# Patient Record
Sex: MALE | Race: WHITE | NOT HISPANIC OR LATINO
[De-identification: names, ages, dates, MRNs, and addresses within clinical notes are randomized per-mention and may not be internally consistent; named-entity substitution may affect disease eponyms.]

---

## 2017-06-19 ENCOUNTER — APPOINTMENT (OUTPATIENT)
Dept: SPINE | Facility: CLINIC | Age: 72
End: 2017-06-19

## 2017-06-19 VITALS
DIASTOLIC BLOOD PRESSURE: 99 MMHG | WEIGHT: 170 LBS | SYSTOLIC BLOOD PRESSURE: 158 MMHG | HEIGHT: 70 IN | HEART RATE: 68 BPM | BODY MASS INDEX: 24.34 KG/M2

## 2017-06-19 RX ORDER — TRAMADOL HYDROCHLORIDE AND ACETAMINOPHEN 37.5; 325 MG/1; MG/1
37.5-325 TABLET, FILM COATED ORAL
Qty: 30 | Refills: 0 | Status: DISCONTINUED | COMMUNITY
Start: 2017-04-17

## 2017-06-19 RX ORDER — METHYLPREDNISOLONE 4 MG/1
4 TABLET ORAL
Qty: 21 | Refills: 0 | Status: DISCONTINUED | COMMUNITY
Start: 2017-04-19

## 2017-06-19 RX ORDER — CIPROFLOXACIN HYDROCHLORIDE 500 MG/1
500 TABLET, FILM COATED ORAL
Qty: 28 | Refills: 0 | Status: DISCONTINUED | COMMUNITY
Start: 2017-03-17

## 2017-06-20 ENCOUNTER — OUTPATIENT (OUTPATIENT)
Dept: OUTPATIENT SERVICES | Facility: HOSPITAL | Age: 72
LOS: 1 days | End: 2017-06-20

## 2017-06-20 DIAGNOSIS — Z98.89 OTHER SPECIFIED POSTPROCEDURAL STATES: Chronic | ICD-10-CM

## 2017-07-12 ENCOUNTER — OUTPATIENT (OUTPATIENT)
Dept: OUTPATIENT SERVICES | Facility: HOSPITAL | Age: 72
LOS: 1 days | End: 2017-07-12
Payer: COMMERCIAL

## 2017-07-12 VITALS
WEIGHT: 166.89 LBS | SYSTOLIC BLOOD PRESSURE: 142 MMHG | OXYGEN SATURATION: 100 % | TEMPERATURE: 98 F | HEIGHT: 69 IN | HEART RATE: 63 BPM | DIASTOLIC BLOOD PRESSURE: 86 MMHG | RESPIRATION RATE: 16 BRPM

## 2017-07-12 DIAGNOSIS — I10 ESSENTIAL (PRIMARY) HYPERTENSION: ICD-10-CM

## 2017-07-12 DIAGNOSIS — K21.9 GASTRO-ESOPHAGEAL REFLUX DISEASE WITHOUT ESOPHAGITIS: ICD-10-CM

## 2017-07-12 DIAGNOSIS — N40.0 BENIGN PROSTATIC HYPERPLASIA WITHOUT LOWER URINARY TRACT SYMPTOMS: ICD-10-CM

## 2017-07-12 DIAGNOSIS — Z98.890 OTHER SPECIFIED POSTPROCEDURAL STATES: Chronic | ICD-10-CM

## 2017-07-12 DIAGNOSIS — Z01.818 ENCOUNTER FOR OTHER PREPROCEDURAL EXAMINATION: ICD-10-CM

## 2017-07-12 DIAGNOSIS — M54.16 RADICULOPATHY, LUMBAR REGION: ICD-10-CM

## 2017-07-12 DIAGNOSIS — Z98.49 CATARACT EXTRACTION STATUS, UNSPECIFIED EYE: Chronic | ICD-10-CM

## 2017-07-12 DIAGNOSIS — Z98.89 OTHER SPECIFIED POSTPROCEDURAL STATES: Chronic | ICD-10-CM

## 2017-07-12 DIAGNOSIS — G47.33 OBSTRUCTIVE SLEEP APNEA (ADULT) (PEDIATRIC): ICD-10-CM

## 2017-07-12 LAB
HCT VFR BLD CALC: 41.4 % — SIGNIFICANT CHANGE UP (ref 39–50)
HGB BLD-MCNC: 13.9 G/DL — SIGNIFICANT CHANGE UP (ref 13–17)
MCHC RBC-ENTMCNC: 31.7 PG — SIGNIFICANT CHANGE UP (ref 27–34)
MCHC RBC-ENTMCNC: 33.6 GM/DL — SIGNIFICANT CHANGE UP (ref 32–36)
MCV RBC AUTO: 94.3 FL — SIGNIFICANT CHANGE UP (ref 80–100)
PLATELET # BLD AUTO: 188 K/UL — SIGNIFICANT CHANGE UP (ref 150–400)
RBC # BLD: 4.39 M/UL — SIGNIFICANT CHANGE UP (ref 4.2–5.8)
RBC # FLD: 13.1 % — SIGNIFICANT CHANGE UP (ref 10.3–14.5)
WBC # BLD: 5.13 K/UL — SIGNIFICANT CHANGE UP (ref 3.8–10.5)
WBC # FLD AUTO: 5.13 K/UL — SIGNIFICANT CHANGE UP (ref 3.8–10.5)

## 2017-07-12 PROCEDURE — G0463: CPT

## 2017-07-12 PROCEDURE — 85027 COMPLETE CBC AUTOMATED: CPT

## 2017-07-12 PROCEDURE — 80048 BASIC METABOLIC PNL TOTAL CA: CPT

## 2017-07-12 RX ORDER — SODIUM CHLORIDE 9 MG/ML
3 INJECTION INTRAMUSCULAR; INTRAVENOUS; SUBCUTANEOUS EVERY 8 HOURS
Qty: 0 | Refills: 0 | Status: DISCONTINUED | OUTPATIENT
Start: 2017-07-25 | End: 2017-07-25

## 2017-07-12 RX ORDER — CEFAZOLIN SODIUM 1 G
2000 VIAL (EA) INJECTION ONCE
Qty: 0 | Refills: 0 | Status: DISCONTINUED | OUTPATIENT
Start: 2017-07-25 | End: 2017-07-25

## 2017-07-12 NOTE — H&P PST ADULT - PSH
Cataract extraction status  h/o bilateral cataract extraction  History of inguinal herniorrhaphy  right side  History of Tonsillectomy  childhood  ORIF Right Radius  1974  S/P cervical discectomy  and fusion C3-C7 9/23/2011

## 2017-07-12 NOTE — H&P PST ADULT - HISTORY OF PRESENT ILLNESS
72 year old male with h/o HTN, ED, BPH, Celiac disease, scheduled for L4-5 lumbar laminectomy removal of synovial cyst for radiculopathy

## 2017-07-12 NOTE — H&P PST ADULT - ACTIVITY
able to walk uphill without stopping, does little lifting and operates heavy machinery due to upper extremities numbness occasionally

## 2017-07-12 NOTE — H&P PST ADULT - PMH
Acid reflux disease    BPH (Benign Prostatic Hypertrophy)    CD (celiac disease)    H/O thyroid nodule    Hypertension  Dx: 2006  Lumbar radiculopathy    Lung nodule seen on imaging study  been monitored for 5 years without any change in size as per patient  ED (Obstructive Sleep Apnea)  Dx: 2001, uses CPAP nightly.

## 2017-07-13 LAB
ANION GAP SERPL CALC-SCNC: 15 MMOL/L — SIGNIFICANT CHANGE UP (ref 5–17)
BUN SERPL-MCNC: 14 MG/DL — SIGNIFICANT CHANGE UP (ref 7–23)
CALCIUM SERPL-MCNC: 9.1 MG/DL — SIGNIFICANT CHANGE UP (ref 8.4–10.5)
CHLORIDE SERPL-SCNC: 102 MMOL/L — SIGNIFICANT CHANGE UP (ref 96–108)
CO2 SERPL-SCNC: 25 MMOL/L — SIGNIFICANT CHANGE UP (ref 22–31)
CREAT SERPL-MCNC: 0.88 MG/DL — SIGNIFICANT CHANGE UP (ref 0.5–1.3)
GLUCOSE SERPL-MCNC: 72 MG/DL — SIGNIFICANT CHANGE UP (ref 70–99)
POTASSIUM SERPL-MCNC: 4.5 MMOL/L — SIGNIFICANT CHANGE UP (ref 3.5–5.3)
POTASSIUM SERPL-SCNC: 4.5 MMOL/L — SIGNIFICANT CHANGE UP (ref 3.5–5.3)
SODIUM SERPL-SCNC: 142 MMOL/L — SIGNIFICANT CHANGE UP (ref 135–145)

## 2017-07-13 RX ORDER — LIDOCAINE HCL 20 MG/ML
0.2 VIAL (ML) INJECTION ONCE
Qty: 0 | Refills: 0 | Status: DISCONTINUED | OUTPATIENT
Start: 2017-07-25 | End: 2017-07-25

## 2017-07-25 ENCOUNTER — INPATIENT (INPATIENT)
Facility: HOSPITAL | Age: 72
LOS: 0 days | Discharge: HOME | DRG: 517 | End: 2017-07-25
Attending: NEUROLOGICAL SURGERY | Admitting: NEUROLOGICAL SURGERY
Payer: COMMERCIAL

## 2017-07-25 ENCOUNTER — RESULT REVIEW (OUTPATIENT)
Age: 72
End: 2017-07-25

## 2017-07-25 ENCOUNTER — APPOINTMENT (OUTPATIENT)
Dept: SPINE | Facility: HOSPITAL | Age: 72
End: 2017-07-25

## 2017-07-25 ENCOUNTER — TRANSCRIPTION ENCOUNTER (OUTPATIENT)
Age: 72
End: 2017-07-25

## 2017-07-25 VITALS
DIASTOLIC BLOOD PRESSURE: 94 MMHG | OXYGEN SATURATION: 96 % | RESPIRATION RATE: 16 BRPM | SYSTOLIC BLOOD PRESSURE: 155 MMHG | HEART RATE: 95 BPM | TEMPERATURE: 99 F

## 2017-07-25 VITALS
HEART RATE: 70 BPM | RESPIRATION RATE: 18 BRPM | DIASTOLIC BLOOD PRESSURE: 90 MMHG | TEMPERATURE: 98 F | HEIGHT: 69 IN | OXYGEN SATURATION: 99 % | SYSTOLIC BLOOD PRESSURE: 170 MMHG

## 2017-07-25 DIAGNOSIS — M54.16 RADICULOPATHY, LUMBAR REGION: ICD-10-CM

## 2017-07-25 DIAGNOSIS — Z98.890 OTHER SPECIFIED POSTPROCEDURAL STATES: Chronic | ICD-10-CM

## 2017-07-25 DIAGNOSIS — Z01.818 ENCOUNTER FOR OTHER PREPROCEDURAL EXAMINATION: ICD-10-CM

## 2017-07-25 DIAGNOSIS — Z98.89 OTHER SPECIFIED POSTPROCEDURAL STATES: Chronic | ICD-10-CM

## 2017-07-25 DIAGNOSIS — Z98.49 CATARACT EXTRACTION STATUS, UNSPECIFIED EYE: Chronic | ICD-10-CM

## 2017-07-25 PROCEDURE — 63267 EXCISE INTRSPINL LESION LMBR: CPT | Mod: GC

## 2017-07-25 PROCEDURE — 88304 TISSUE EXAM BY PATHOLOGIST: CPT

## 2017-07-25 PROCEDURE — 88304 TISSUE EXAM BY PATHOLOGIST: CPT | Mod: 26

## 2017-07-25 PROCEDURE — C1769: CPT

## 2017-07-25 PROCEDURE — 76000 FLUOROSCOPY <1 HR PHYS/QHP: CPT

## 2017-07-25 PROCEDURE — C1889: CPT

## 2017-07-25 PROCEDURE — 69990 MICROSURGERY ADD-ON: CPT | Mod: GC

## 2017-07-25 RX ORDER — ONDANSETRON 8 MG/1
4 TABLET, FILM COATED ORAL ONCE
Qty: 0 | Refills: 0 | Status: COMPLETED | OUTPATIENT
Start: 2017-07-25 | End: 2017-07-25

## 2017-07-25 RX ORDER — ACETAMINOPHEN 500 MG
650 TABLET ORAL EVERY 6 HOURS
Qty: 0 | Refills: 0 | Status: DISCONTINUED | OUTPATIENT
Start: 2017-07-25 | End: 2017-07-25

## 2017-07-25 RX ORDER — HYDROMORPHONE HYDROCHLORIDE 2 MG/ML
0.25 INJECTION INTRAMUSCULAR; INTRAVENOUS; SUBCUTANEOUS
Qty: 0 | Refills: 0 | Status: DISCONTINUED | OUTPATIENT
Start: 2017-07-25 | End: 2017-07-25

## 2017-07-25 RX ORDER — ACETAMINOPHEN 500 MG
1000 TABLET ORAL ONCE
Qty: 0 | Refills: 0 | Status: DISCONTINUED | OUTPATIENT
Start: 2017-07-25 | End: 2017-07-25

## 2017-07-25 RX ORDER — DEXTROSE MONOHYDRATE, SODIUM CHLORIDE, AND POTASSIUM CHLORIDE 50; .745; 4.5 G/1000ML; G/1000ML; G/1000ML
1000 INJECTION, SOLUTION INTRAVENOUS
Qty: 0 | Refills: 0 | Status: DISCONTINUED | OUTPATIENT
Start: 2017-07-25 | End: 2017-07-25

## 2017-07-25 RX ORDER — OXYCODONE AND ACETAMINOPHEN 5; 325 MG/1; MG/1
2 TABLET ORAL EVERY 6 HOURS
Qty: 0 | Refills: 0 | Status: DISCONTINUED | OUTPATIENT
Start: 2017-07-25 | End: 2017-07-25

## 2017-07-25 RX ADMIN — ONDANSETRON 4 MILLIGRAM(S): 8 TABLET, FILM COATED ORAL at 15:20

## 2017-07-25 RX ADMIN — SODIUM CHLORIDE 3 MILLILITER(S): 9 INJECTION INTRAMUSCULAR; INTRAVENOUS; SUBCUTANEOUS at 13:54

## 2017-07-25 RX ADMIN — DEXTROSE MONOHYDRATE, SODIUM CHLORIDE, AND POTASSIUM CHLORIDE 75 MILLILITER(S): 50; .745; 4.5 INJECTION, SOLUTION INTRAVENOUS at 11:53

## 2017-07-25 NOTE — ASU DISCHARGE PLAN (ADULT/PEDIATRIC). - NOTIFY
Numbness, color, or temperature change to extremity/Pain not relieved by Medications/Numbness, tingling/Bleeding that does not stop/Swelling that continues

## 2017-07-25 NOTE — ASU DISCHARGE PLAN (ADULT/PEDIATRIC). - MEDICATION SUMMARY - MEDICATIONS TO TAKE
I will START or STAY ON the medications listed below when I get home from the hospital:    MethylPREDNISolone Dose Pack 4 mg oral tablet  -- Use as directed.   -- It is very important that you take or use this exactly as directed.  Do not skip doses or discontinue unless directed by your doctor.  Obtain medical advice before taking any non-prescription drugs as some may affect the action of this medication.  Take with food or milk.    -- Indication: For steriods    oxycodone-acetaminophen 5 mg-300 mg oral tablet  -- 1 tab(s) by mouth every 4 hours, As Needed -for moderate pain MDD:10 tabs  -- Caution federal law prohibits the transfer of this drug to any person other  than the person for whom it was prescribed.  May cause drowsiness.  Alcohol may intensify this effect.  Use care when operating dangerous machinery.  This drug may impair the ability to drive or operate machinery.  Use care until you become familiar with its effects.  This prescription cannot be refilled.  This product contains acetaminophen.  Do not use  with any other product containing acetaminophen to prevent possible liver damage.  Using more of this medication than prescribed may cause serious breathing problems.    -- Indication: For pain    enalapril 5 mg oral tablet  -- 1 tab(s) by mouth once a day  -- Indication: For Home med    gabapentin  --  by mouth   last dose on 7/12/17  -- Indication: For Home med    cefadroxil 500 mg oral capsule  -- 1 cap(s) by mouth 3 times a day for 7 days MDD:3 capsules  -- Finish all this medication unless otherwise directed by prescriber.    -- Indication: For Antibiotics    omeprazole 20 mg oral delayed release tablet  -- 1 tab(s) by mouth once a day  -- Indication: For Home med    Vitamin B12 100 mcg oral tablet  -- 1 tab(s) by mouth once a day  -- Indication: For Homemed

## 2017-07-28 ENCOUNTER — CLINICAL ADVICE (OUTPATIENT)
Age: 72
End: 2017-07-28

## 2017-08-07 ENCOUNTER — EMERGENCY (EMERGENCY)
Facility: HOSPITAL | Age: 72
LOS: 1 days | Discharge: HOME | End: 2017-08-07
Attending: EMERGENCY MEDICINE | Admitting: EMERGENCY MEDICINE
Payer: COMMERCIAL

## 2017-08-07 ENCOUNTER — CLINICAL ADVICE (OUTPATIENT)
Age: 72
End: 2017-08-07

## 2017-08-07 VITALS
TEMPERATURE: 98 F | DIASTOLIC BLOOD PRESSURE: 87 MMHG | OXYGEN SATURATION: 97 % | HEART RATE: 75 BPM | WEIGHT: 169.98 LBS | RESPIRATION RATE: 17 BRPM | SYSTOLIC BLOOD PRESSURE: 166 MMHG

## 2017-08-07 VITALS
DIASTOLIC BLOOD PRESSURE: 85 MMHG | SYSTOLIC BLOOD PRESSURE: 134 MMHG | OXYGEN SATURATION: 100 % | HEART RATE: 71 BPM | RESPIRATION RATE: 17 BRPM | TEMPERATURE: 98 F

## 2017-08-07 DIAGNOSIS — Z98.49 CATARACT EXTRACTION STATUS, UNSPECIFIED EYE: Chronic | ICD-10-CM

## 2017-08-07 DIAGNOSIS — Z98.89 OTHER SPECIFIED POSTPROCEDURAL STATES: Chronic | ICD-10-CM

## 2017-08-07 DIAGNOSIS — Z98.890 OTHER SPECIFIED POSTPROCEDURAL STATES: Chronic | ICD-10-CM

## 2017-08-07 LAB
ALBUMIN SERPL ELPH-MCNC: 4.3 G/DL — SIGNIFICANT CHANGE UP (ref 3.3–5)
ALP SERPL-CCNC: 67 U/L — SIGNIFICANT CHANGE UP (ref 40–120)
ALT FLD-CCNC: 13 U/L RC — SIGNIFICANT CHANGE UP (ref 10–45)
ANION GAP SERPL CALC-SCNC: 12 MMOL/L — SIGNIFICANT CHANGE UP (ref 5–17)
APPEARANCE UR: CLEAR — SIGNIFICANT CHANGE UP
APTT BLD: 33.3 SEC — SIGNIFICANT CHANGE UP (ref 27.5–37.4)
AST SERPL-CCNC: 17 U/L — SIGNIFICANT CHANGE UP (ref 10–40)
BASOPHILS # BLD AUTO: 0 K/UL — SIGNIFICANT CHANGE UP (ref 0–0.2)
BASOPHILS NFR BLD AUTO: 0.4 % — SIGNIFICANT CHANGE UP (ref 0–2)
BILIRUB SERPL-MCNC: 0.3 MG/DL — SIGNIFICANT CHANGE UP (ref 0.2–1.2)
BILIRUB UR-MCNC: NEGATIVE — SIGNIFICANT CHANGE UP
BUN SERPL-MCNC: 13 MG/DL — SIGNIFICANT CHANGE UP (ref 7–23)
CALCIUM SERPL-MCNC: 9.5 MG/DL — SIGNIFICANT CHANGE UP (ref 8.4–10.5)
CHLORIDE SERPL-SCNC: 102 MMOL/L — SIGNIFICANT CHANGE UP (ref 96–108)
CO2 SERPL-SCNC: 29 MMOL/L — SIGNIFICANT CHANGE UP (ref 22–31)
COLOR SPEC: COLORLESS — SIGNIFICANT CHANGE UP
CREAT SERPL-MCNC: 1.01 MG/DL — SIGNIFICANT CHANGE UP (ref 0.5–1.3)
DIFF PNL FLD: NEGATIVE — SIGNIFICANT CHANGE UP
EOSINOPHIL # BLD AUTO: 0.1 K/UL — SIGNIFICANT CHANGE UP (ref 0–0.5)
EOSINOPHIL NFR BLD AUTO: 0.7 % — SIGNIFICANT CHANGE UP (ref 0–6)
GAS PNL BLDV: SIGNIFICANT CHANGE UP
GLUCOSE SERPL-MCNC: 109 MG/DL — HIGH (ref 70–99)
GLUCOSE UR QL: NEGATIVE — SIGNIFICANT CHANGE UP
HCT VFR BLD CALC: 40.9 % — SIGNIFICANT CHANGE UP (ref 39–50)
HGB BLD-MCNC: 14.2 G/DL — SIGNIFICANT CHANGE UP (ref 13–17)
INR BLD: 0.97 RATIO — SIGNIFICANT CHANGE UP (ref 0.88–1.16)
KETONES UR-MCNC: NEGATIVE — SIGNIFICANT CHANGE UP
LEUKOCYTE ESTERASE UR-ACNC: NEGATIVE — SIGNIFICANT CHANGE UP
LYMPHOCYTES # BLD AUTO: 2.5 K/UL — SIGNIFICANT CHANGE UP (ref 1–3.3)
LYMPHOCYTES # BLD AUTO: 35.2 % — SIGNIFICANT CHANGE UP (ref 13–44)
MCHC RBC-ENTMCNC: 33.1 PG — SIGNIFICANT CHANGE UP (ref 27–34)
MCHC RBC-ENTMCNC: 34.7 GM/DL — SIGNIFICANT CHANGE UP (ref 32–36)
MCV RBC AUTO: 95.4 FL — SIGNIFICANT CHANGE UP (ref 80–100)
MONOCYTES # BLD AUTO: 0.5 K/UL — SIGNIFICANT CHANGE UP (ref 0–0.9)
MONOCYTES NFR BLD AUTO: 7.7 % — SIGNIFICANT CHANGE UP (ref 2–14)
NEUTROPHILS # BLD AUTO: 3.9 K/UL — SIGNIFICANT CHANGE UP (ref 1.8–7.4)
NEUTROPHILS NFR BLD AUTO: 55.9 % — SIGNIFICANT CHANGE UP (ref 43–77)
NITRITE UR-MCNC: NEGATIVE — SIGNIFICANT CHANGE UP
PH UR: 7 — SIGNIFICANT CHANGE UP (ref 5–8)
PLATELET # BLD AUTO: 222 K/UL — SIGNIFICANT CHANGE UP (ref 150–400)
POTASSIUM SERPL-MCNC: 3.8 MMOL/L — SIGNIFICANT CHANGE UP (ref 3.5–5.3)
POTASSIUM SERPL-SCNC: 3.8 MMOL/L — SIGNIFICANT CHANGE UP (ref 3.5–5.3)
PROT SERPL-MCNC: 7.3 G/DL — SIGNIFICANT CHANGE UP (ref 6–8.3)
PROT UR-MCNC: NEGATIVE — SIGNIFICANT CHANGE UP
PROTHROM AB SERPL-ACNC: 10.5 SEC — SIGNIFICANT CHANGE UP (ref 9.8–12.7)
RBC # BLD: 4.29 M/UL — SIGNIFICANT CHANGE UP (ref 4.2–5.8)
RBC # FLD: 11.5 % — SIGNIFICANT CHANGE UP (ref 10.3–14.5)
SODIUM SERPL-SCNC: 143 MMOL/L — SIGNIFICANT CHANGE UP (ref 135–145)
SP GR SPEC: 1 — LOW (ref 1.01–1.02)
SURGICAL PATHOLOGY STUDY: SIGNIFICANT CHANGE UP
TROPONIN T SERPL-MCNC: <0.01 NG/ML — SIGNIFICANT CHANGE UP (ref 0–0.06)
UROBILINOGEN FLD QL: NEGATIVE — SIGNIFICANT CHANGE UP
WBC # BLD: 7 K/UL — SIGNIFICANT CHANGE UP (ref 3.8–10.5)
WBC # FLD AUTO: 7 K/UL — SIGNIFICANT CHANGE UP (ref 3.8–10.5)

## 2017-08-07 PROCEDURE — 85014 HEMATOCRIT: CPT

## 2017-08-07 PROCEDURE — 84295 ASSAY OF SERUM SODIUM: CPT

## 2017-08-07 PROCEDURE — 84484 ASSAY OF TROPONIN QUANT: CPT

## 2017-08-07 PROCEDURE — 84132 ASSAY OF SERUM POTASSIUM: CPT

## 2017-08-07 PROCEDURE — 82330 ASSAY OF CALCIUM: CPT

## 2017-08-07 PROCEDURE — 85610 PROTHROMBIN TIME: CPT

## 2017-08-07 PROCEDURE — 82435 ASSAY OF BLOOD CHLORIDE: CPT

## 2017-08-07 PROCEDURE — 84100 ASSAY OF PHOSPHORUS: CPT

## 2017-08-07 PROCEDURE — 93005 ELECTROCARDIOGRAM TRACING: CPT

## 2017-08-07 PROCEDURE — 83605 ASSAY OF LACTIC ACID: CPT

## 2017-08-07 PROCEDURE — 96374 THER/PROPH/DIAG INJ IV PUSH: CPT

## 2017-08-07 PROCEDURE — 93010 ELECTROCARDIOGRAM REPORT: CPT

## 2017-08-07 PROCEDURE — 83735 ASSAY OF MAGNESIUM: CPT

## 2017-08-07 PROCEDURE — 80053 COMPREHEN METABOLIC PANEL: CPT

## 2017-08-07 PROCEDURE — 96375 TX/PRO/DX INJ NEW DRUG ADDON: CPT

## 2017-08-07 PROCEDURE — 82947 ASSAY GLUCOSE BLOOD QUANT: CPT

## 2017-08-07 PROCEDURE — 85027 COMPLETE CBC AUTOMATED: CPT

## 2017-08-07 PROCEDURE — 82803 BLOOD GASES ANY COMBINATION: CPT

## 2017-08-07 PROCEDURE — 99285 EMERGENCY DEPT VISIT HI MDM: CPT | Mod: 25

## 2017-08-07 PROCEDURE — 81001 URINALYSIS AUTO W/SCOPE: CPT

## 2017-08-07 PROCEDURE — 85730 THROMBOPLASTIN TIME PARTIAL: CPT

## 2017-08-07 PROCEDURE — 99284 EMERGENCY DEPT VISIT MOD MDM: CPT | Mod: 25

## 2017-08-07 RX ORDER — METOCLOPRAMIDE HCL 10 MG
10 TABLET ORAL ONCE
Qty: 0 | Refills: 0 | Status: COMPLETED | OUTPATIENT
Start: 2017-08-07 | End: 2017-08-07

## 2017-08-07 RX ORDER — ACETAMINOPHEN 500 MG
1000 TABLET ORAL ONCE
Qty: 0 | Refills: 0 | Status: COMPLETED | OUTPATIENT
Start: 2017-08-07 | End: 2017-08-07

## 2017-08-07 RX ORDER — GABAPENTIN 400 MG/1
0 CAPSULE ORAL
Qty: 0 | Refills: 0 | COMMUNITY

## 2017-08-07 RX ORDER — CAFFEINE 200 MG
1 TABLET ORAL
Qty: 12 | Refills: 0 | OUTPATIENT
Start: 2017-08-07 | End: 2017-08-09

## 2017-08-07 RX ORDER — SODIUM CHLORIDE 9 MG/ML
1000 INJECTION INTRAMUSCULAR; INTRAVENOUS; SUBCUTANEOUS ONCE
Qty: 0 | Refills: 0 | Status: COMPLETED | OUTPATIENT
Start: 2017-08-07 | End: 2017-08-07

## 2017-08-07 RX ORDER — DIPHENHYDRAMINE HCL 50 MG
25 CAPSULE ORAL ONCE
Qty: 0 | Refills: 0 | Status: COMPLETED | OUTPATIENT
Start: 2017-08-07 | End: 2017-08-07

## 2017-08-07 RX ADMIN — Medication 25 MILLIGRAM(S): at 17:55

## 2017-08-07 RX ADMIN — Medication 10 MILLIGRAM(S): at 17:55

## 2017-08-07 RX ADMIN — SODIUM CHLORIDE 1000 MILLILITER(S): 9 INJECTION INTRAMUSCULAR; INTRAVENOUS; SUBCUTANEOUS at 19:02

## 2017-08-07 RX ADMIN — Medication 400 MILLIGRAM(S): at 17:38

## 2017-08-07 RX ADMIN — Medication 1000 MILLIGRAM(S): at 18:26

## 2017-08-07 RX ADMIN — SODIUM CHLORIDE 1000 MILLILITER(S): 9 INJECTION INTRAMUSCULAR; INTRAVENOUS; SUBCUTANEOUS at 17:38

## 2017-08-07 NOTE — ED ADULT NURSE NOTE - OBJECTIVE STATEMENT
72y male presents to the ER with weakness, neck pain, and headaches. pt is alert and oriented x 3 and speaking coherently. Pt had cysts removed on July 25th and states a few days after the sx he began to experience weakness, neck pains and headaches that are worsened when standing. Pt states he had the same symptoms after an epidural two years ago. pt has hx of htn. Pt appears calm at this time. no respiratory distress noted. Pt ambulates independently- gait steady. Will continue to reassess.

## 2017-08-07 NOTE — ED PROVIDER NOTE - PROGRESS NOTE DETAILS
attending Violaack: evaluated by NSG who recommend blood patch if symptoms persist. Patient offered blood patch with anesthesia but declines. Headache abated after medications. Wishes to go home with close outpatient follow-up and strict return precautions.

## 2017-08-07 NOTE — ED PROVIDER NOTE - MEDICAL DECISION MAKING DETAILS
attending Tracy:72yM h/o HTN postop from l3-l4 rt synovial cyst removal on 7/25 with Dr. Clement presents with persistent headache and fatigue. HA worse upon standing. Similar symptoms after epidural 2 years ago. On examination, well-appearing, MMM, neuro intact, surgical site c/d/i. Likely post-dural headache. Will treat headache symptomatically, obtain bloodwork eval for other causes of vague fatigue (anemia, cardiac enzymes), NSG consultation and reassess.

## 2017-08-14 ENCOUNTER — APPOINTMENT (OUTPATIENT)
Dept: SPINE | Facility: CLINIC | Age: 72
End: 2017-08-14
Payer: MEDICARE

## 2017-08-14 VITALS
HEART RATE: 69 BPM | WEIGHT: 170 LBS | DIASTOLIC BLOOD PRESSURE: 82 MMHG | BODY MASS INDEX: 24.34 KG/M2 | HEIGHT: 70 IN | SYSTOLIC BLOOD PRESSURE: 140 MMHG

## 2017-08-14 PROCEDURE — 99024 POSTOP FOLLOW-UP VISIT: CPT

## 2017-09-11 ENCOUNTER — APPOINTMENT (OUTPATIENT)
Dept: SPINE | Facility: CLINIC | Age: 72
End: 2017-09-11
Payer: MEDICARE

## 2017-09-11 VITALS
SYSTOLIC BLOOD PRESSURE: 161 MMHG | HEIGHT: 70 IN | WEIGHT: 170 LBS | HEART RATE: 68 BPM | BODY MASS INDEX: 24.34 KG/M2 | DIASTOLIC BLOOD PRESSURE: 87 MMHG

## 2017-09-11 PROCEDURE — 99024 POSTOP FOLLOW-UP VISIT: CPT

## 2017-09-11 RX ORDER — IBUPROFEN 800 MG/1
800 TABLET, FILM COATED ORAL
Qty: 15 | Refills: 0 | Status: COMPLETED | COMMUNITY
Start: 2017-04-10 | End: 2017-09-11

## 2017-09-11 RX ORDER — NAPROXEN 500 MG/1
500 TABLET ORAL
Qty: 60 | Refills: 0 | Status: COMPLETED | COMMUNITY
Start: 2017-04-03 | End: 2017-09-11

## 2017-11-28 NOTE — ED PROVIDER NOTE - PR
Subjective:      Patient ID: Reginaldo Boudreaux is a 72 y.o. male. HPISugars usu 120s- 130s, rarely to 160, but no pattern. Doing PT at home    Review of Systems   Eyes: Negative for visual disturbance. Neurological: Positive for weakness and numbness (mild, in feet). Prickly, pokey sensation from the R foot up to above the knee       Objective:   Physical Exam   Cardiovascular: Normal rate, regular rhythm and normal heart sounds. No murmur heard. Pulmonary/Chest: Effort normal and breath sounds normal.   Neurological: He exhibits abnormal muscle tone (able to raise heel off the ground, but not R toes and not whole foot).      Vitals:    11/28/17 1629   BP: 138/72   Pulse: 96   Weight: 155 lb (70.3 kg)         Assessment:      DM control improvimg      Plan:      Same meds  A1C and renal  3 mos 160

## 2017-12-04 ENCOUNTER — APPOINTMENT (OUTPATIENT)
Dept: SPINE | Facility: CLINIC | Age: 72
End: 2017-12-04
Payer: MEDICARE

## 2017-12-04 VITALS
BODY MASS INDEX: 24.34 KG/M2 | HEART RATE: 76 BPM | DIASTOLIC BLOOD PRESSURE: 84 MMHG | SYSTOLIC BLOOD PRESSURE: 144 MMHG | HEIGHT: 70 IN | WEIGHT: 170 LBS

## 2017-12-04 DIAGNOSIS — Z98.890 OTHER SPECIFIED POSTPROCEDURAL STATES: ICD-10-CM

## 2017-12-04 DIAGNOSIS — G95.9 DISEASE OF SPINAL CORD, UNSPECIFIED: ICD-10-CM

## 2017-12-04 DIAGNOSIS — M54.16 RADICULOPATHY, LUMBAR REGION: ICD-10-CM

## 2017-12-04 PROCEDURE — 99213 OFFICE O/P EST LOW 20 MIN: CPT

## 2018-06-18 ENCOUNTER — APPOINTMENT (OUTPATIENT)
Dept: SURGERY | Facility: CLINIC | Age: 73
End: 2018-06-18
Payer: MEDICARE

## 2018-06-18 VITALS
HEIGHT: 70 IN | WEIGHT: 163 LBS | HEART RATE: 63 BPM | BODY MASS INDEX: 23.34 KG/M2 | OXYGEN SATURATION: 100 % | DIASTOLIC BLOOD PRESSURE: 90 MMHG | TEMPERATURE: 97.5 F | SYSTOLIC BLOOD PRESSURE: 155 MMHG

## 2018-06-18 DIAGNOSIS — Z86.69 PERSONAL HISTORY OF OTHER DISEASES OF THE NERVOUS SYSTEM AND SENSE ORGANS: ICD-10-CM

## 2018-06-18 DIAGNOSIS — Z80.42 FAMILY HISTORY OF MALIGNANT NEOPLASM OF PROSTATE: ICD-10-CM

## 2018-06-18 DIAGNOSIS — Z85.46 PERSONAL HISTORY OF MALIGNANT NEOPLASM OF PROSTATE: ICD-10-CM

## 2018-06-18 PROCEDURE — 99204 OFFICE O/P NEW MOD 45 MIN: CPT

## 2018-07-23 PROBLEM — K90.0 CELIAC DISEASE: Chronic | Status: ACTIVE | Noted: 2017-07-12

## 2018-07-23 PROBLEM — M54.16 RADICULOPATHY, LUMBAR REGION: Chronic | Status: ACTIVE | Noted: 2017-07-12

## 2018-07-23 PROBLEM — K21.9 GASTRO-ESOPHAGEAL REFLUX DISEASE WITHOUT ESOPHAGITIS: Chronic | Status: ACTIVE | Noted: 2017-07-12

## 2018-07-23 PROBLEM — Z86.39 PERSONAL HISTORY OF OTHER ENDOCRINE, NUTRITIONAL AND METABOLIC DISEASE: Chronic | Status: ACTIVE | Noted: 2017-07-12

## 2018-07-23 PROBLEM — R91.1 SOLITARY PULMONARY NODULE: Chronic | Status: ACTIVE | Noted: 2017-07-12

## 2018-08-08 ENCOUNTER — APPOINTMENT (OUTPATIENT)
Dept: SURGERY | Facility: CLINIC | Age: 73
End: 2018-08-08
Payer: MEDICARE

## 2018-08-08 VITALS
WEIGHT: 159 LBS | DIASTOLIC BLOOD PRESSURE: 78 MMHG | OXYGEN SATURATION: 98 % | HEART RATE: 70 BPM | BODY MASS INDEX: 22.76 KG/M2 | SYSTOLIC BLOOD PRESSURE: 148 MMHG | TEMPERATURE: 98.7 F | HEIGHT: 70 IN

## 2018-08-08 PROCEDURE — 99214 OFFICE O/P EST MOD 30 MIN: CPT

## 2018-12-07 ENCOUNTER — APPOINTMENT (OUTPATIENT)
Dept: SURGERY | Facility: CLINIC | Age: 73
End: 2018-12-07
Payer: MEDICARE

## 2018-12-07 VITALS
TEMPERATURE: 97.4 F | HEIGHT: 70 IN | OXYGEN SATURATION: 97 % | BODY MASS INDEX: 23.34 KG/M2 | DIASTOLIC BLOOD PRESSURE: 83 MMHG | SYSTOLIC BLOOD PRESSURE: 148 MMHG | HEART RATE: 68 BPM | WEIGHT: 163 LBS

## 2018-12-07 PROCEDURE — 99214 OFFICE O/P EST MOD 30 MIN: CPT

## 2019-01-07 ENCOUNTER — APPOINTMENT (OUTPATIENT)
Dept: SPINE | Facility: CLINIC | Age: 74
End: 2019-01-07
Payer: MEDICARE

## 2019-01-07 VITALS
HEIGHT: 69 IN | WEIGHT: 162 LBS | SYSTOLIC BLOOD PRESSURE: 145 MMHG | DIASTOLIC BLOOD PRESSURE: 74 MMHG | BODY MASS INDEX: 23.99 KG/M2 | HEART RATE: 67 BPM

## 2019-01-07 DIAGNOSIS — Z98.1 ARTHRODESIS STATUS: ICD-10-CM

## 2019-01-07 DIAGNOSIS — Z78.9 OTHER SPECIFIED HEALTH STATUS: ICD-10-CM

## 2019-01-07 PROCEDURE — 99213 OFFICE O/P EST LOW 20 MIN: CPT

## 2019-01-07 NOTE — ASSESSMENT
[FreeTextEntry1] : No indications were for any further surgical intervention at the present time. He does not wish to take any medication or pursue pain management. Continue with chiropractic treatment.

## 2019-01-07 NOTE — REASON FOR VISIT
[Spouse] : spouse [Follow-Up: _____] : a [unfilled] follow-up visit [FreeTextEntry1] : Patient is 1 year and 1/2 s/p microlumbar discectomy for removal of synovial cyst. He has had some LBP and increased stiffness

## 2019-01-07 NOTE — PHYSICAL EXAM
[Motor Strength] : muscle strength was normal in all four extremities [Sensation Tactile Decrease] : light touch was intact [Abnormal Walk] : normal gait

## 2019-01-09 PROBLEM — Z78.9 NON-SMOKER: Status: ACTIVE | Noted: 2018-06-18

## 2019-01-23 ENCOUNTER — OUTPATIENT (OUTPATIENT)
Dept: OUTPATIENT SERVICES | Facility: HOSPITAL | Age: 74
LOS: 1 days | End: 2019-01-23
Payer: COMMERCIAL

## 2019-01-23 VITALS
WEIGHT: 162.26 LBS | DIASTOLIC BLOOD PRESSURE: 93 MMHG | HEIGHT: 68 IN | SYSTOLIC BLOOD PRESSURE: 163 MMHG | RESPIRATION RATE: 20 BRPM | HEART RATE: 67 BPM

## 2019-01-23 DIAGNOSIS — K40.90 UNILATERAL INGUINAL HERNIA, WITHOUT OBSTRUCTION OR GANGRENE, NOT SPECIFIED AS RECURRENT: ICD-10-CM

## 2019-01-23 DIAGNOSIS — Z98.890 OTHER SPECIFIED POSTPROCEDURAL STATES: Chronic | ICD-10-CM

## 2019-01-23 DIAGNOSIS — K42.9 UMBILICAL HERNIA WITHOUT OBSTRUCTION OR GANGRENE: ICD-10-CM

## 2019-01-23 DIAGNOSIS — Z98.49 CATARACT EXTRACTION STATUS, UNSPECIFIED EYE: Chronic | ICD-10-CM

## 2019-01-23 DIAGNOSIS — Z01.818 ENCOUNTER FOR OTHER PREPROCEDURAL EXAMINATION: ICD-10-CM

## 2019-01-23 DIAGNOSIS — I10 ESSENTIAL (PRIMARY) HYPERTENSION: ICD-10-CM

## 2019-01-23 DIAGNOSIS — K40.91 UNILATERAL INGUINAL HERNIA, WITHOUT OBSTRUCTION OR GANGRENE, RECURRENT: ICD-10-CM

## 2019-01-23 DIAGNOSIS — Z98.89 OTHER SPECIFIED POSTPROCEDURAL STATES: Chronic | ICD-10-CM

## 2019-01-23 DIAGNOSIS — Z29.9 ENCOUNTER FOR PROPHYLACTIC MEASURES, UNSPECIFIED: ICD-10-CM

## 2019-01-23 LAB
ANION GAP SERPL CALC-SCNC: 11 MMOL/L — SIGNIFICANT CHANGE UP (ref 5–17)
APTT BLD: 34 SEC — SIGNIFICANT CHANGE UP (ref 27.5–36.3)
BASOPHILS # BLD AUTO: 0 K/UL — SIGNIFICANT CHANGE UP (ref 0–0.2)
BASOPHILS NFR BLD AUTO: 0.2 % — SIGNIFICANT CHANGE UP (ref 0–2)
BLD GP AB SCN SERPL QL: SIGNIFICANT CHANGE UP
BUN SERPL-MCNC: 13 MG/DL — SIGNIFICANT CHANGE UP (ref 8–20)
CALCIUM SERPL-MCNC: 9 MG/DL — SIGNIFICANT CHANGE UP (ref 8.6–10.2)
CHLORIDE SERPL-SCNC: 105 MMOL/L — SIGNIFICANT CHANGE UP (ref 98–107)
CO2 SERPL-SCNC: 28 MMOL/L — SIGNIFICANT CHANGE UP (ref 22–29)
CREAT SERPL-MCNC: 0.76 MG/DL — SIGNIFICANT CHANGE UP (ref 0.5–1.3)
EOSINOPHIL # BLD AUTO: 0.1 K/UL — SIGNIFICANT CHANGE UP (ref 0–0.5)
EOSINOPHIL NFR BLD AUTO: 1.8 % — SIGNIFICANT CHANGE UP (ref 0–5)
GLUCOSE SERPL-MCNC: 86 MG/DL — SIGNIFICANT CHANGE UP (ref 70–115)
HCT VFR BLD CALC: 41.3 % — LOW (ref 42–52)
HGB BLD-MCNC: 13.9 G/DL — LOW (ref 14–18)
INR BLD: 0.94 RATIO — SIGNIFICANT CHANGE UP (ref 0.88–1.16)
LYMPHOCYTES # BLD AUTO: 1.9 K/UL — SIGNIFICANT CHANGE UP (ref 1–4.8)
LYMPHOCYTES # BLD AUTO: 38.4 % — SIGNIFICANT CHANGE UP (ref 20–55)
MCHC RBC-ENTMCNC: 31.5 PG — HIGH (ref 27–31)
MCHC RBC-ENTMCNC: 33.7 G/DL — SIGNIFICANT CHANGE UP (ref 32–36)
MCV RBC AUTO: 93.7 FL — SIGNIFICANT CHANGE UP (ref 80–94)
MONOCYTES # BLD AUTO: 0.5 K/UL — SIGNIFICANT CHANGE UP (ref 0–0.8)
MONOCYTES NFR BLD AUTO: 10.7 % — HIGH (ref 3–10)
NEUTROPHILS # BLD AUTO: 2.4 K/UL — SIGNIFICANT CHANGE UP (ref 1.8–8)
NEUTROPHILS NFR BLD AUTO: 48.5 % — SIGNIFICANT CHANGE UP (ref 37–73)
PLATELET # BLD AUTO: 163 K/UL — SIGNIFICANT CHANGE UP (ref 150–400)
POTASSIUM SERPL-MCNC: 4.6 MMOL/L — SIGNIFICANT CHANGE UP (ref 3.5–5.3)
POTASSIUM SERPL-SCNC: 4.6 MMOL/L — SIGNIFICANT CHANGE UP (ref 3.5–5.3)
PROTHROM AB SERPL-ACNC: 10.8 SEC — SIGNIFICANT CHANGE UP (ref 10–12.9)
RBC # BLD: 4.41 M/UL — LOW (ref 4.6–6.2)
RBC # FLD: 12.6 % — SIGNIFICANT CHANGE UP (ref 11–15.6)
SODIUM SERPL-SCNC: 144 MMOL/L — SIGNIFICANT CHANGE UP (ref 135–145)
TYPE + AB SCN PNL BLD: SIGNIFICANT CHANGE UP
WBC # BLD: 5 K/UL — SIGNIFICANT CHANGE UP (ref 4.8–10.8)
WBC # FLD AUTO: 5 K/UL — SIGNIFICANT CHANGE UP (ref 4.8–10.8)

## 2019-01-23 PROCEDURE — 86901 BLOOD TYPING SEROLOGIC RH(D): CPT

## 2019-01-23 PROCEDURE — 85610 PROTHROMBIN TIME: CPT

## 2019-01-23 PROCEDURE — 36415 COLL VENOUS BLD VENIPUNCTURE: CPT

## 2019-01-23 PROCEDURE — 93005 ELECTROCARDIOGRAM TRACING: CPT

## 2019-01-23 PROCEDURE — 80048 BASIC METABOLIC PNL TOTAL CA: CPT

## 2019-01-23 PROCEDURE — 86900 BLOOD TYPING SEROLOGIC ABO: CPT

## 2019-01-23 PROCEDURE — 85730 THROMBOPLASTIN TIME PARTIAL: CPT

## 2019-01-23 PROCEDURE — 93010 ELECTROCARDIOGRAM REPORT: CPT

## 2019-01-23 PROCEDURE — 85027 COMPLETE CBC AUTOMATED: CPT

## 2019-01-23 PROCEDURE — 86850 RBC ANTIBODY SCREEN: CPT

## 2019-01-23 PROCEDURE — G0463: CPT

## 2019-01-23 RX ORDER — SODIUM CHLORIDE 9 MG/ML
3 INJECTION INTRAMUSCULAR; INTRAVENOUS; SUBCUTANEOUS ONCE
Qty: 0 | Refills: 0 | Status: DISCONTINUED | OUTPATIENT
Start: 2019-02-07 | End: 2019-02-07

## 2019-01-23 NOTE — ASU PATIENT PROFILE, ADULT - PMH
Acid reflux disease    BPH (Benign Prostatic Hypertrophy)    CD (celiac disease)    H/O thyroid nodule    Hypertension  Dx: 2006  Inguinal hernia    Lumbar radiculopathy    Lung nodule seen on imaging study  been monitored for 5 years without any change in size as per patient  ED (Obstructive Sleep Apnea)  Dx: 2001, uses CPAP nightly.  Prostate cancer    Umbilical hernia without obstruction or gangrene

## 2019-01-23 NOTE — ASU PATIENT PROFILE, ADULT - LEARNING ASSESSMENT (PATIENT) ADDITIONAL COMMENTS
Instructed pt on pre-op instructions, tips for safer surgery, pain management scale, pre-surgical infection prevention instructions, medical clearnace - pending, take Enalapril with a sip of water the morning of surgery, understanding of all instructions verbalized,.

## 2019-01-23 NOTE — H&P PST ADULT - ASSESSMENT
74 y/o male seen today pre-op laparoscopic possible open bilateral inguinal hernia repair with possible MESH, open umbilical hernia repair with possible MESH. Surgery protocol reviewed with pt today. Pt to follow-up with PCP for clearance     AGE RELATED RISK FACTORS                                                       MOBILITY RELATED FACTORS  [ ] Age 41-60 years                                            (1 Point)                  [ ] Bed rest                                                        (1 Point)  [x ] Age: 61-74 years                                           (2 Points)                 [ ] Plaster cast                                                   (2 Points)  [ ] Age= 75 years                                              (3 Points)                 [ ] Bed bound for more than 72 hours                 (2 Points)    DISEASE RELATED RISK FACTORS                                               GENDER SPECIFIC FACTORS  [ ] Edema in the lower extremities                       (1 Point)                  [ ] Pregnancy                                                     (1 Point)  [ ] Varicose veins                                               (1 Point)                  [ ] Post-partum < 6 weeks                                   (1 Point)             [ ] BMI > 25 Kg/m2                                            (1 Point)                  [ ] Hormonal therapy  or oral contraception          (1 Point)                 [ ] Sepsis (in the previous month)                        (1 Point)                  [ ] History of pregnancy complications                 (1 point)  [ ] Pneumonia or serious lung disease                                               [ ] Unexplained or recurrent                     (1 Point)           (in the previous month)                               (1 Point)  [ ] Abnormal pulmonary function test                     (1 Point)                 SURGERY RELATED RISK FACTORS  [ ] Acute myocardial infarction                              (1 Point)                 [ ]  Section                                             (1 Point)  [ ] Congestive heart failure (in the previous month)  (1 Point)               [ ] Minor surgery                                                  (1 Point)   [ ] Inflammatory bowel disease                             (1 Point)                 [ ] Arthroscopic surgery                                        (2 Points)  [ ] Central venous access                                      (2 Points)                [x ] General surgery lasting more than 45 minutes   (2 Points)       [ ] Stroke (in the previous month)                          (5 Points)               [ ] Elective arthroplasty                                         (5 Points)                                                                                                                                               HEMATOLOGY RELATED FACTORS                                                 TRAUMA RELATED RISK FACTORS  [ ] Prior episodes of VTE                                     (3 Points)                [ ] Fracture of the hip, pelvis, or leg                       (5 Points)  [ ] Positive family history for VTE                         (3 Points)                 [ ] Acute spinal cord injury (in the previous month)  (5 Points)  [ ] Prothrombin 90878 A                                     (3 Points)                 [ ] Paralysis  (less than 1 month)                             (5 Points)  [ ] Factor V Leiden                                             (3 Points)                  [ ] Multiple Trauma within 1 month                        (5 Points)  [ ] Lupus anticoagulants                                     (3 Points)                                                           [ ] Anticardiolipin antibodies                               (3 Points)                                                       [ ] High homocysteine in the blood                      (3 Points)                                             [ ] Other congenital or acquired thrombophilia      (3 Points)                                                [ ] Heparin induced thrombocytopenia                  (3 Points)                                          Total Score [   4       ]  OPIOID RISK TOOL    CHARLES EACH BOX THAT APPLIES AND ADD TOTALS AT THE END    FAMILY HISTORY OF SUBSTANCE ABUSE                 FEMALE         MALE                                                Alcohol                             [  ]1 pt          [  ]3pts                                               Illegal Durgs                     [  ]2 pts        [  ]3pts                                               Rx Drugs                           [  ]4 pts        [  ]4 pts    PERSONAL HISTORY OF SUBSTANCE ABUSE                                                                                          Alcohol                             [  ]3 pts       [  ]3 pts                                               Illegal Drugs                     [  ]4 pts        [  ]4 pts                                               Rx Drugs                           [  ]5 pts        [  ]5 pts    AGE BETWEEN 16-45 YEARS                                      [  ]1 pt         [  ]1 pt    HISTORY OF PREADOLESCENT   SEXUAL ABUSE                                                             [  ]3 pts        [  ]0pts    PSYCHOLOGICAL DISEASE                     ADD, OCD, Bipolar, Schizophrenia        [  ]2 pts         [  ]2 pts                      Depression                                               [  ]1 pt           [  ]1 pt           SCORING TOTAL   (add numbers and type here)              (*0)                                     A score of 3 or lower indicated LOW risk for future opioid abuse  A score of 4 to 7 indicated moderate risk for future opioid abuse  A score of 8 or higher indicates a high risk for opioid abuse

## 2019-01-23 NOTE — H&P PST ADULT - FAMILY HISTORY
Father  Still living? No  Family history of hypertension in father, Age at diagnosis: Age Unknown  Family history of prostate cancer in father, Age at diagnosis: Age Unknown

## 2019-01-23 NOTE — H&P PST ADULT - PSH
Cataract extraction status  h/o bilateral cataract extraction  History of inguinal herniorrhaphy  right side  History of Tonsillectomy  childhood  ORIF Right Radius  1974  S/P cervical discectomy  and fusion C3-C7 9/23/2011 Cataract extraction status  h/o bilateral cataract extraction  H/O arthroscopy of left knee    History of inguinal herniorrhaphy  right side  History of Tonsillectomy  childhood  ORIF Right Radius  1974  S/P cervical discectomy  and fusion C3-C7 9/23/2011

## 2019-01-23 NOTE — ASU PATIENT PROFILE, ADULT - ABILITY TO HEAR (WITH HEARING AID OR HEARING APPLIANCE IF NORMALLY USED):
Mildly to Moderately Impaired: difficulty hearing in some environments or speaker may need to increase volume or speak distinctly/high frequency sounds/tones

## 2019-01-23 NOTE — H&P PST ADULT - PROBLEM SELECTOR PLAN 1
laparoscopic possible open bilateral inguinal hernia repair with possible MESH, open umbilical hernia repair with possible MESH

## 2019-01-23 NOTE — H&P PST ADULT - HISTORY OF PRESENT ILLNESS
72 y/o male seen today pre-op laparoscopic possible open bilateral inguinal hernia repair with possible MESH, open umbilical hernia repair with possible MESH for umbilical hernia and bilateral inguinal hernia. Pt endorsed intermittent  pain to right groin and umbilical site.

## 2019-01-23 NOTE — H&P PST ADULT - ATTENDING COMMENTS
The risks, benefits, and alternatives including the option of doing nothing to a laparoscopic and possible open bilateral inguinal hernia repair with mesh and open umbilical hernia repair with possible mesh were discussed.  The potential complications including but not limited to infection, bleeding, recurrent herniation, and possible chronic post operative pain were discussed.  The patient admitted understanding and agrees to proceed as planned.

## 2019-01-23 NOTE — ASU PATIENT PROFILE, ADULT - PSH
Cataract extraction status  h/o bilateral cataract extraction  H/O arthroscopy of left knee    History of inguinal herniorrhaphy  right side  History of Tonsillectomy  childhood  ORIF Right Radius  1974  S/P cervical discectomy  and fusion C3-C7 9/23/2011

## 2019-02-06 ENCOUNTER — TRANSCRIPTION ENCOUNTER (OUTPATIENT)
Age: 74
End: 2019-02-06

## 2019-02-07 ENCOUNTER — OUTPATIENT (OUTPATIENT)
Dept: INPATIENT UNIT | Facility: HOSPITAL | Age: 74
LOS: 1 days | End: 2019-02-07
Payer: COMMERCIAL

## 2019-02-07 VITALS
OXYGEN SATURATION: 99 % | HEART RATE: 73 BPM | SYSTOLIC BLOOD PRESSURE: 146 MMHG | TEMPERATURE: 100 F | DIASTOLIC BLOOD PRESSURE: 88 MMHG | RESPIRATION RATE: 16 BRPM

## 2019-02-07 VITALS
RESPIRATION RATE: 16 BRPM | TEMPERATURE: 98 F | OXYGEN SATURATION: 100 % | WEIGHT: 160.94 LBS | SYSTOLIC BLOOD PRESSURE: 149 MMHG | HEIGHT: 70 IN | HEART RATE: 68 BPM | DIASTOLIC BLOOD PRESSURE: 79 MMHG

## 2019-02-07 DIAGNOSIS — K42.9 UMBILICAL HERNIA WITHOUT OBSTRUCTION OR GANGRENE: ICD-10-CM

## 2019-02-07 DIAGNOSIS — Z98.890 OTHER SPECIFIED POSTPROCEDURAL STATES: Chronic | ICD-10-CM

## 2019-02-07 DIAGNOSIS — Z98.89 OTHER SPECIFIED POSTPROCEDURAL STATES: Chronic | ICD-10-CM

## 2019-02-07 DIAGNOSIS — K40.91 UNILATERAL INGUINAL HERNIA, WITHOUT OBSTRUCTION OR GANGRENE, RECURRENT: ICD-10-CM

## 2019-02-07 DIAGNOSIS — K40.90 UNILATERAL INGUINAL HERNIA, WITHOUT OBSTRUCTION OR GANGRENE, NOT SPECIFIED AS RECURRENT: ICD-10-CM

## 2019-02-07 DIAGNOSIS — Z98.49 CATARACT EXTRACTION STATUS, UNSPECIFIED EYE: Chronic | ICD-10-CM

## 2019-02-07 PROBLEM — C61 MALIGNANT NEOPLASM OF PROSTATE: Chronic | Status: ACTIVE | Noted: 2019-01-23

## 2019-02-07 LAB — ABO RH CONFIRMATION: SIGNIFICANT CHANGE UP

## 2019-02-07 PROCEDURE — 49585: CPT

## 2019-02-07 PROCEDURE — 49650 LAP ING HERNIA REPAIR INIT: CPT | Mod: LT

## 2019-02-07 PROCEDURE — C1781: CPT

## 2019-02-07 PROCEDURE — 36415 COLL VENOUS BLD VENIPUNCTURE: CPT

## 2019-02-07 PROCEDURE — 49651 LAP ING HERNIA REPAIR RECUR: CPT | Mod: RT

## 2019-02-07 RX ORDER — FENTANYL CITRATE 50 UG/ML
25 INJECTION INTRAVENOUS
Qty: 0 | Refills: 0 | Status: DISCONTINUED | OUTPATIENT
Start: 2019-02-07 | End: 2019-02-07

## 2019-02-07 RX ORDER — SODIUM CHLORIDE 9 MG/ML
1000 INJECTION, SOLUTION INTRAVENOUS
Qty: 0 | Refills: 0 | Status: DISCONTINUED | OUTPATIENT
Start: 2019-02-07 | End: 2019-02-07

## 2019-02-07 RX ORDER — ONDANSETRON 8 MG/1
4 TABLET, FILM COATED ORAL ONCE
Qty: 0 | Refills: 0 | Status: DISCONTINUED | OUTPATIENT
Start: 2019-02-07 | End: 2019-02-07

## 2019-02-07 RX ORDER — CEFAZOLIN SODIUM 1 G
2000 VIAL (EA) INJECTION ONCE
Qty: 0 | Refills: 0 | Status: COMPLETED | OUTPATIENT
Start: 2019-02-07 | End: 2019-02-07

## 2019-02-07 RX ADMIN — FENTANYL CITRATE 25 MICROGRAM(S): 50 INJECTION INTRAVENOUS at 11:15

## 2019-02-07 RX ADMIN — FENTANYL CITRATE 25 MICROGRAM(S): 50 INJECTION INTRAVENOUS at 11:00

## 2019-02-07 RX ADMIN — Medication 100 MILLIGRAM(S): at 07:52

## 2019-02-07 RX ADMIN — FENTANYL CITRATE 25 MICROGRAM(S): 50 INJECTION INTRAVENOUS at 10:45

## 2019-02-07 RX ADMIN — FENTANYL CITRATE 25 MICROGRAM(S): 50 INJECTION INTRAVENOUS at 11:30

## 2019-02-07 NOTE — BRIEF OPERATIVE NOTE - OPERATION/FINDINGS
Recurrent right inguinal hernia repair with mesh, left inguinal hernia repair with mesh, primary umbilical hernia repair

## 2019-02-07 NOTE — BRIEF OPERATIVE NOTE - POST-OP DX
Left inguinal hernia  02/07/2019    Rod Sotelo  Recurrent inguinal hernia of right side without obstruction or gangrene  02/07/2019    Rod Sotelo  Umbilical hernia without obstruction and without gangrene  02/07/2019    Rod Sotelo

## 2019-02-07 NOTE — BRIEF OPERATIVE NOTE - PROCEDURE
<<-----Click on this checkbox to enter Procedure Umbilical hernia repair  02/07/2019    Active  [place] [id number]  Herniorrhaphy, inguinal, bilateral, laparoscopic, with bilateral mesh insertion  02/07/2019  Bilateral inguinal hernia repair, right recurrent inguinal hernia, left inguinal hernia repairs with mesh  [facility], inguinal, bilateral, laparoscopic, with bilateral mesh insertion  02/07/2019  Bilateral inguinal hernia repair, right recurrent inguinal hernia, left inguinal hernia repairs with mesh  Active  Rod Haile  Umbilical hernia repair  02/07/2019    Active  Rod Haile

## 2019-02-07 NOTE — BRIEF OPERATIVE NOTE - PRE-OP DX
Left inguinal hernia  02/07/2019    Rod Sotelo  Recurrent inguinal hernia of right side with obstruction and without gangrene  02/07/2019    Rod Sotelo  Umbilical hernia without obstruction and without gangrene  02/07/2019    Rod Sotelo

## 2019-02-07 NOTE — ASU DISCHARGE PLAN (ADULT/PEDIATRIC). - MEDICATION SUMMARY - MEDICATIONS TO TAKE
I will START or STAY ON the medications listed below when I get home from the hospital:    enalapril 10 mg oral tablet  -- 1 tab(s) by mouth 2 times a day  -- Indication: For as per PMD    Fish Oil 1000 mg oral capsule  -- 1 cap(s) by mouth once a day  -- Indication: For as per PMD    omeprazole 20 mg oral delayed release tablet  -- 1 tab(s) by mouth once a day  -- Indication: For as per PMD    oxybutynin 10 mg/24 hr oral tablet, extended release  -- 1 tab(s) by mouth once a day  -- Indication: For as per PMD    Vitamin B12 100 mcg oral tablet  -- 1 tab(s) by mouth once a day  -- Indication: For as per PMD    Vitamin D3 1000 intl units oral tablet, chewable  -- 3 tab(s) by mouth once a day  -- Indication: For as per PMD

## 2019-02-15 ENCOUNTER — APPOINTMENT (OUTPATIENT)
Dept: SURGERY | Facility: CLINIC | Age: 74
End: 2019-02-15
Payer: MEDICARE

## 2019-02-15 VITALS
SYSTOLIC BLOOD PRESSURE: 159 MMHG | HEART RATE: 77 BPM | HEIGHT: 69 IN | TEMPERATURE: 97.8 F | WEIGHT: 161 LBS | OXYGEN SATURATION: 96 % | DIASTOLIC BLOOD PRESSURE: 81 MMHG | BODY MASS INDEX: 23.85 KG/M2

## 2019-02-15 PROCEDURE — 99024 POSTOP FOLLOW-UP VISIT: CPT

## 2019-02-15 NOTE — HISTORY OF PRESENT ILLNESS
[de-identified] : The patient returns to the office with no complaints.  He is very pleased with his surgical results thus far.  He has no significant pain, no nausea or vomit.

## 2019-02-15 NOTE — PHYSICAL EXAM
[JVD] : no jugular venous distention  [No Rash or Lesion] : No rash or lesion [Purpura] : no purpura  [Petechiae] : no petechiae [Skin Ulcer] : no ulcer [Skin Induration] : no induration [Alert] : alert [Oriented to Person] : oriented to person [Oriented to Place] : oriented to place [Oriented to Time] : oriented to time [Calm] : calm [de-identified] : non toxic in no acute distress [de-identified] : NC/AT PERRL EOMI no scleral icterus [de-identified] : trachea midline no gross mass [de-identified] : no audible wheezing or stridor [de-identified] : mildly obese soft, no localizing tenderness, no guarding, no rebound, no masses [de-identified] : phallus normal, no testicular mass or tenderness [de-identified] : FROM of all extremities with no gross deformity, there is no recurrent umbilical or inguinal hernias [de-identified] : surgical incisions are healing well without evidence of infection  [de-identified] : mood is calm

## 2019-02-15 NOTE — ASSESSMENT
[FreeTextEntry1] : The patient is stable and doing well following a laparoscopic recurrent right inguinal hernia repair and left inguinal hernia repair with mesh, and an open umbilical hernia repair.  The patient will avoid heavy or strenuous lifting and follow up in 2 weeks time or sooner should any problems or issues arise.

## 2019-02-27 ENCOUNTER — NON-APPOINTMENT (OUTPATIENT)
Age: 74
End: 2019-02-27

## 2019-02-27 ENCOUNTER — APPOINTMENT (OUTPATIENT)
Dept: PULMONOLOGY | Facility: CLINIC | Age: 74
End: 2019-02-27
Payer: MEDICARE

## 2019-02-27 VITALS
HEIGHT: 70 IN | WEIGHT: 161 LBS | SYSTOLIC BLOOD PRESSURE: 128 MMHG | TEMPERATURE: 98 F | DIASTOLIC BLOOD PRESSURE: 82 MMHG | HEART RATE: 59 BPM | BODY MASS INDEX: 23.05 KG/M2 | OXYGEN SATURATION: 90 %

## 2019-02-27 DIAGNOSIS — R93.89 ABNORMAL FINDINGS ON DIAGNOSTIC IMAGING OF OTHER SPECIFIED BODY STRUCTURES: ICD-10-CM

## 2019-02-27 PROCEDURE — 94010 BREATHING CAPACITY TEST: CPT

## 2019-02-27 PROCEDURE — 99204 OFFICE O/P NEW MOD 45 MIN: CPT | Mod: 25

## 2019-02-27 RX ORDER — RISEDRONATE SODIUM 150 MG/1
150 TABLET, FILM COATED ORAL
Refills: 0 | Status: DISCONTINUED | COMMUNITY
End: 2019-02-27

## 2019-02-27 RX ORDER — ENALAPRIL MALEATE 10 MG/1
10 TABLET ORAL
Qty: 180 | Refills: 0 | Status: DISCONTINUED | COMMUNITY
Start: 2017-03-02 | End: 2019-02-27

## 2019-02-27 RX ORDER — OMEPRAZOLE 20 MG/1
20 CAPSULE, DELAYED RELEASE ORAL
Qty: 90 | Refills: 0 | Status: DISCONTINUED | COMMUNITY
Start: 2017-03-04 | End: 2019-02-27

## 2019-02-27 NOTE — ASSESSMENT
[FreeTextEntry1] : The patient is a pleasant 73-year-old gentleman, retired , who is not a smoker\par \par I have reviewed CAT scans from 2014 and 2017 at [facility]. He reportedly had a 2013 scan as well.  There was a 2 mm left lower lobe lesion which did not change over a three-year period, probably a 4 year period. There were scattered atelectatic changes. There was no evidence of asbestos exposure.\par \par In a nonsmoker, there is no reason to maintain surveillance of the stable 2 mm lesion in the left lung\par \par The patient did have spirometry today consistent with mild COPD with a decrease in his FEV1 percent. Nevertheless, the patient has no pulmonary symptoms and does not appear to require treatment. He is also quite resistant to the thought of using medication.\par \par He appears to have mild COPD with essentially no symptoms.\par \par \par I have discussed above with the patient. I will be glad to see him in followup in one year for his mild COPD

## 2019-02-27 NOTE — PROCEDURE
[FreeTextEntry1] : Spirometry was reviewed there is a decrease in his FEV1 percent 64% predicted and a decrease in his mid expiratory flow rates consistent with mild COPD

## 2019-02-27 NOTE — CONSULT LETTER
[Dear  ___] : Dear  [unfilled], [FreeTextEntry1] : I had the pleasure of evaluating your patient, MARCELLUS LAWSON , in the office today.  Please review my consultation and evaluation report that follows below.  Please do not hesitate to call me if further information is necessary or if you wish to discuss ongoing care or diagnostic work-up.   \par I very much appreciate your referral and it is a privilege to be able to provide care for your patient.\par \par Sincerely,\par  \par Richard Tapia MD, MHCM, FACP\par Pulmonary Medicine\par  of Medicine\par [employer] at \[facility]\""/[facility]\par \par [email or web address]\par [facility] [place]\par \par

## 2019-02-27 NOTE — HISTORY OF PRESENT ILLNESS
[FreeTextEntry1] : The patient is a 73-year-old gentleman referred by Dr. Serrano for evaluation of an abnormal chest CT\par \par The patient had been followed by a pulmonary physician in 2014 for a left lower lobe nodule\par No CT has been done since 2017\par \par The patient is a nonsmoker. He says he had some asbestos exposure. He had double pneumonia in 1965 requiring hospitalization. However he has never had significant pulmonary issues. He remains quite active\par \par The patient has a history of hypertension GERD and high cholesterol\par \par The patient takes enalapril omeprazole pravastatin oxybutynin

## 2019-03-01 ENCOUNTER — APPOINTMENT (OUTPATIENT)
Dept: SURGERY | Facility: CLINIC | Age: 74
End: 2019-03-01
Payer: MEDICARE

## 2019-03-01 VITALS
BODY MASS INDEX: 23.19 KG/M2 | WEIGHT: 162 LBS | SYSTOLIC BLOOD PRESSURE: 134 MMHG | HEART RATE: 75 BPM | OXYGEN SATURATION: 99 % | TEMPERATURE: 97.6 F | DIASTOLIC BLOOD PRESSURE: 76 MMHG | HEIGHT: 70 IN

## 2019-03-01 DIAGNOSIS — K40.90 UNILATERAL INGUINAL HERNIA, W/OUT OBSTRUCTION OR GANGRENE, NOT SPECIFIED AS RECURRENT: ICD-10-CM

## 2019-03-01 DIAGNOSIS — K42.9 UMBILICAL HERNIA W/OUT OBSTRUCTION OR GANGRENE: ICD-10-CM

## 2019-03-01 DIAGNOSIS — K40.91 UNILATERAL INGUINAL HERNIA, W/OUT OBSTRUCTION OR GANGRENE, RECURRENT: ICD-10-CM

## 2019-03-01 PROCEDURE — 99024 POSTOP FOLLOW-UP VISIT: CPT

## 2019-03-01 NOTE — ASSESSMENT
[FreeTextEntry1] : The patient is stable and doing well following an open umbilical hernia repair and a laparoscopic mesh repair of a left inguinal hernia and recurrent right inguinal hernia.  The patient will follow up in 12 months or sooner should any problems or issues arise.

## 2019-03-01 NOTE — PHYSICAL EXAM
[JVD] : no jugular venous distention  [No Rash or Lesion] : No rash or lesion [Purpura] : no purpura  [Petechiae] : no petechiae [Skin Ulcer] : no ulcer [Skin Induration] : no induration [Alert] : alert [Oriented to Person] : oriented to person [Oriented to Place] : oriented to place [Oriented to Time] : oriented to time [Calm] : calm [de-identified] : non toxic in no acute distress [de-identified] : NC/AT PERRL EOMI no scleral icterus [de-identified] : trachea midline no gross mass [de-identified] : no audible wheezing or stridor [de-identified] : mildly obese soft, no localizing tenderness, no guarding, no rebound, no masses [de-identified] : phallus normal, no testicular mass or tenderness [de-identified] : FROM of all extremities with no gross deformity, there remains no recurrent umbilical or inguinal hernias [de-identified] : surgical incisions are healed well without evidence of infection  [de-identified] : mood is calm

## 2019-03-01 NOTE — HISTORY OF PRESENT ILLNESS
[de-identified] : The patient returns to the office with marked improvement in the pain in the right and the left inguinal areas.  He states he still experiences mild intermittent discomfort in the right groin after activity. Overall he is very pleased with his surgical results thus far.

## 2019-03-25 ENCOUNTER — RESULT REVIEW (OUTPATIENT)
Age: 74
End: 2019-03-25

## 2019-05-07 NOTE — BRIEF OPERATIVE NOTE - TYPE OF ANESTHESIA
Hemostasis: Drysol Detail Level: Detailed Consent was obtained from the patient. The risks and benefits to therapy were discussed in detail. Specifically, the risks of infection, scarring, bleeding, prolonged wound healing, incomplete removal, allergy to anesthesia, nerve injury and recurrence were addressed. Prior to the procedure, the treatment site was clearly identified and confirmed by the patient. All components of Universal Protocol/PAUSE Rule completed. Size Of Lesion In Cm (Required): 1.3 Anesthesia Type: 1% lidocaine with epinephrine Medical Necessity Clause: This procedure was medically necessary because the lesion that was treated was: Notification Instructions: Patient will be notified of biopsy results. However, patient instructed to call the office if not contacted within 2 weeks. General Post-Care Instructions: I reviewed with the patient in detail post-care instructions. Patient is to keep the biopsy site dry overnight, and then apply bacitracin twice daily until healed. Patient may apply hydrogen peroxide soaks to remove any crusting. Medical Necessity Information: It is in your best interest to select a reason for this procedure from the list below. All of these items fulfill various CMS LCD requirements except the new and changing color options. Add Variable For Additional Medical Justification: No Bill For Surgical Tray: yes Path Notes (To The Dermatopathologist): Please check margins. Biopsy Method: Dermablade Billing Type: Third-Party Bill Wound Care: Petrolatum

## 2019-06-25 ENCOUNTER — OUTPATIENT (OUTPATIENT)
Dept: OUTPATIENT SERVICES | Facility: HOSPITAL | Age: 74
LOS: 1 days | End: 2019-06-25
Payer: COMMERCIAL

## 2019-06-25 DIAGNOSIS — Z98.89 OTHER SPECIFIED POSTPROCEDURAL STATES: Chronic | ICD-10-CM

## 2019-06-25 DIAGNOSIS — R06.00 DYSPNEA, UNSPECIFIED: ICD-10-CM

## 2019-06-25 DIAGNOSIS — Z98.890 OTHER SPECIFIED POSTPROCEDURAL STATES: Chronic | ICD-10-CM

## 2019-06-25 DIAGNOSIS — Z98.49 CATARACT EXTRACTION STATUS, UNSPECIFIED EYE: Chronic | ICD-10-CM

## 2019-06-25 PROCEDURE — 93018 CV STRESS TEST I&R ONLY: CPT

## 2019-06-25 PROCEDURE — 93016 CV STRESS TEST SUPVJ ONLY: CPT

## 2019-06-25 PROCEDURE — 93017 CV STRESS TEST TRACING ONLY: CPT

## 2020-03-09 ENCOUNTER — APPOINTMENT (OUTPATIENT)
Dept: SURGERY | Facility: CLINIC | Age: 75
End: 2020-03-09
Payer: MEDICARE

## 2020-03-09 VITALS
WEIGHT: 165 LBS | BODY MASS INDEX: 23.62 KG/M2 | SYSTOLIC BLOOD PRESSURE: 156 MMHG | DIASTOLIC BLOOD PRESSURE: 77 MMHG | OXYGEN SATURATION: 97 % | HEART RATE: 71 BPM | HEIGHT: 70 IN | TEMPERATURE: 97.7 F

## 2020-03-09 DIAGNOSIS — Z09 ENCOUNTER FOR FOLLOW-UP EXAMINATION AFTER COMPLETED TREATMENT FOR CONDITIONS OTHER THAN MALIGNANT NEOPLASM: ICD-10-CM

## 2020-03-09 PROCEDURE — 99214 OFFICE O/P EST MOD 30 MIN: CPT

## 2020-03-09 NOTE — PHYSICAL EXAM
[JVD] : no jugular venous distention  [No Rash or Lesion] : No rash or lesion [Purpura] : no purpura  [Petechiae] : no petechiae [Skin Ulcer] : no ulcer [Skin Induration] : no induration [Alert] : alert [Oriented to Person] : oriented to person [Oriented to Place] : oriented to place [Oriented to Time] : oriented to time [Calm] : calm [de-identified] : non toxic in no acute distress [de-identified] : NC/AT PERRL EOMI no scleral icterus [de-identified] : trachea midline no gross mass [de-identified] : no audible wheezing or stridor [de-identified] : mildly obese soft, no localizing tenderness, no guarding, no rebound, no masses [de-identified] : phallus normal, no testicular mass or tenderness [de-identified] : FROM of all extremities with no gross deformity, there remains no recurrent umbilical or inguinal hernias and no localizing tenderness  [de-identified] : surgical incisions are healed  [de-identified] : mood is calm

## 2020-03-09 NOTE — ASSESSMENT
[FreeTextEntry1] : The patient is a 74 year old male who is one year following repair of a left inguinal hernia and a right recurrent inguinal hernia done laparoscopically with mesh.  The patient is due to have a prostatectomy in 3 weeks.  I have recommended Advil but in light of the upcoming surgery he was advised not to take it.  The patient was advised to ask the Urologist to take intra-op photos of the inguinal areas so I can ascertain the nature of the mesh.  He was also advised to bring copies of the CT scan films in the event the pain fails to improve.

## 2020-03-09 NOTE — HISTORY OF PRESENT ILLNESS
[de-identified] : The patient returns to the office with a 5 to 6 week history of intermittent burning right groin pain.  The patient reports that the pain is not preventing him from participating in his daily activities but seems to be located in the area of the old prior hernia repair.

## 2020-03-09 NOTE — DATA REVIEWED
[FreeTextEntry1] : Independent review of the abd/pel CT scan performed at [facility] reveals that there is no evidence of recurrent inguinal hernia.

## 2020-03-19 ENCOUNTER — RESULT REVIEW (OUTPATIENT)
Age: 75
End: 2020-03-19

## 2020-06-18 ENCOUNTER — APPOINTMENT (OUTPATIENT)
Dept: VASCULAR SURGERY | Facility: CLINIC | Age: 75
End: 2020-06-18

## 2020-07-28 ENCOUNTER — APPOINTMENT (OUTPATIENT)
Dept: VASCULAR SURGERY | Facility: CLINIC | Age: 75
End: 2020-07-28
Payer: MEDICARE

## 2020-07-28 VITALS
DIASTOLIC BLOOD PRESSURE: 76 MMHG | BODY MASS INDEX: 23.62 KG/M2 | HEART RATE: 70 BPM | WEIGHT: 165 LBS | SYSTOLIC BLOOD PRESSURE: 151 MMHG | HEIGHT: 70 IN | OXYGEN SATURATION: 96 % | TEMPERATURE: 98 F

## 2020-07-28 DIAGNOSIS — I83.813 VARICOSE VEINS OF BILATERAL LOWER EXTREMITIES WITH PAIN: ICD-10-CM

## 2020-07-28 PROCEDURE — 99203 OFFICE O/P NEW LOW 30 MIN: CPT

## 2020-07-28 PROCEDURE — 93971 EXTREMITY STUDY: CPT

## 2020-07-28 RX ORDER — PRAVASTATIN SODIUM 10 MG/1
10 TABLET ORAL
Refills: 0 | Status: DISCONTINUED | COMMUNITY
End: 2020-07-28

## 2020-08-04 ENCOUNTER — OUTPATIENT (OUTPATIENT)
Dept: OUTPATIENT SERVICES | Facility: HOSPITAL | Age: 75
LOS: 1 days | End: 2020-08-04
Payer: COMMERCIAL

## 2020-08-04 DIAGNOSIS — Z98.49 CATARACT EXTRACTION STATUS, UNSPECIFIED EYE: Chronic | ICD-10-CM

## 2020-08-04 DIAGNOSIS — Z98.890 OTHER SPECIFIED POSTPROCEDURAL STATES: Chronic | ICD-10-CM

## 2020-08-04 DIAGNOSIS — R06.02 SHORTNESS OF BREATH: ICD-10-CM

## 2020-08-04 DIAGNOSIS — I25.118 ATHEROSCLEROTIC HEART DISEASE OF NATIVE CORONARY ARTERY WITH OTHER FORMS OF ANGINA PECTORIS: ICD-10-CM

## 2020-08-04 DIAGNOSIS — Z98.89 OTHER SPECIFIED POSTPROCEDURAL STATES: Chronic | ICD-10-CM

## 2020-08-04 PROCEDURE — 78452 HT MUSCLE IMAGE SPECT MULT: CPT

## 2020-08-04 PROCEDURE — 78452 HT MUSCLE IMAGE SPECT MULT: CPT | Mod: 26

## 2020-08-04 PROCEDURE — 93018 CV STRESS TEST I&R ONLY: CPT

## 2020-08-04 PROCEDURE — A9500: CPT

## 2020-08-04 PROCEDURE — 93016 CV STRESS TEST SUPVJ ONLY: CPT

## 2020-08-04 PROCEDURE — 93017 CV STRESS TEST TRACING ONLY: CPT

## 2020-08-25 PROBLEM — I83.813 VARICOSE VEINS OF BOTH LOWER EXTREMITIES WITH PAIN: Status: ACTIVE | Noted: 2020-07-28

## 2020-08-25 NOTE — PHYSICAL EXAM
[Normal Rate and Rhythm] : normal rate and rhythm [2+] : left 2+ [Ankle Swelling (On Exam)] : not present [] : not present [Ankle Swelling On The Left] : moderate [Varicose Veins Of Lower Extremities] : bilaterally [No Rash or Lesion] : No rash or lesion [Alert] : alert [Oriented to Person] : oriented to person [Oriented to Place] : oriented to place [Oriented to Time] : oriented to time [Calm] : calm [de-identified] : NAD [de-identified] : supple, no masses [de-identified] : EOMI, no facial edema, no ptosis or facial droop [de-identified] : soft, NT, ND, no pulsatile mass [de-identified] : unlabored breathing [de-identified] : FROM of all 4 extremities\par

## 2020-08-25 NOTE — ASSESSMENT
[FreeTextEntry1] : 75-year-old male with past medical history of COPD, degenerative disc disease, lumbar radiculopathy status post spine surgery presenting for evaluation of right groin and right thigh pain and swelling.\par \par Venous duplex negative for DVT, SVT, or VI [Foot care/Footwear] : foot care/footwear

## 2020-08-25 NOTE — HISTORY OF PRESENT ILLNESS
[FreeTextEntry1] : 75-year-old male with past medical history of HTN, COPD, degenerative disc disease, lumbar radiculopathy status post spine surgery presenting for evaluation of right groin and right thigh pain and swelling.  Patient states his symptoms have resolved prior to his appointment.  He denies history of prior DVT, PE, SVT.  He denies claudication or rest pain.  Symptoms have not returned.

## 2020-09-16 ENCOUNTER — APPOINTMENT (OUTPATIENT)
Dept: PULMONOLOGY | Facility: CLINIC | Age: 75
End: 2020-09-16
Payer: MEDICARE

## 2020-09-16 VITALS
HEIGHT: 70 IN | OXYGEN SATURATION: 98 % | WEIGHT: 165 LBS | DIASTOLIC BLOOD PRESSURE: 70 MMHG | SYSTOLIC BLOOD PRESSURE: 120 MMHG | BODY MASS INDEX: 23.62 KG/M2 | HEART RATE: 76 BPM

## 2020-09-16 PROCEDURE — 99215 OFFICE O/P EST HI 40 MIN: CPT

## 2020-09-16 RX ORDER — OXYBUTYNIN CHLORIDE 10 MG/1
10 TABLET, EXTENDED RELEASE ORAL
Refills: 0 | Status: DISCONTINUED | COMMUNITY
End: 2020-09-16

## 2020-09-16 RX ORDER — OMEPRAZOLE 20 MG/1
20 CAPSULE, DELAYED RELEASE ORAL
Refills: 0 | Status: COMPLETED | COMMUNITY
End: 2020-09-16

## 2020-09-16 NOTE — REVIEW OF SYSTEMS
[Anemia] : anemia [Negative] : Psychiatric [TextBox_30] : see HPI [TextBox_44] : see HPI [TextBox_69] : p [TextBox_83] : post prostate surgery

## 2020-09-16 NOTE — PHYSICAL EXAM
[No Acute Distress] : no acute distress [Normal Rate/Rhythm] : normal rate/rhythm [Normal S1, S2] : normal s1, s2 [No Murmurs] : no murmurs [No Resp Distress] : no resp distress [No Acc Muscle Use] : no acc muscle use [Clear to Auscultation Bilaterally] : clear to auscultation bilaterally [No Abnormalities] : no abnormalities [No Clubbing] : no clubbing [Normal Gait] : normal gait [TextBox_105] : 1 plus edema

## 2020-09-16 NOTE — DISCUSSION/SUMMARY
[FreeTextEntry1] : asthma mild intermittent, prn rescue, stable minute nodules by hx, last CXR 5/20 neg\par post total prostatectomy, complicated by seroma with intermittent drainage\par never smoker, no pulmonary complaints\par lung exam is normal, normal sp02\par per pt borderline pulmonary htn by echocardiogram \par has ED, compliant with CPAP, will check overnight oximetry on CPAP\par discussed CTA, perfusion scan, he is not interested currently, recent vacular work up negative for DVT\par will repeat PFTs, await Cardiology input\par vaccinations this fall, 3 months or sooner if needed

## 2020-09-16 NOTE — HISTORY OF PRESENT ILLNESS
[TextBox_4] : never smoker\par mild air flow obstruction\par previous LLL nodule stable\par doing well, no fever, chill, chest pain\par no dyspnea\par sent for mild pulmonary hypertension\par has ED on capap\par Cardiology Dr Poon advantage [facility]\par recently saw vascular, no DVT

## 2020-09-16 NOTE — CONSULT LETTER
[Dear  ___] : Dear  [unfilled], [Consult Letter:] : I had the pleasure of evaluating your patient, [unfilled]. [FreeTextEntry3] : Crow [facility]\par Pulmonary Critical Care\par Director Pulmonary Division\par Medical Director Respiratory Therapy\par [facility]\par \par  [Sincerely,] : Sincerely, [Please see my note below.] : Please see my note below.

## 2020-12-07 ENCOUNTER — APPOINTMENT (OUTPATIENT)
Dept: DISASTER EMERGENCY | Facility: CLINIC | Age: 75
End: 2020-12-07

## 2020-12-10 ENCOUNTER — APPOINTMENT (OUTPATIENT)
Dept: PULMONOLOGY | Facility: CLINIC | Age: 75
End: 2020-12-10

## 2021-01-07 ENCOUNTER — APPOINTMENT (OUTPATIENT)
Dept: DISASTER EMERGENCY | Facility: CLINIC | Age: 76
End: 2021-01-07

## 2021-01-09 LAB — SARS-COV-2 N GENE NPH QL NAA+PROBE: NOT DETECTED

## 2021-01-11 ENCOUNTER — APPOINTMENT (OUTPATIENT)
Dept: PULMONOLOGY | Facility: CLINIC | Age: 76
End: 2021-01-11
Payer: MEDICARE

## 2021-01-11 VITALS
DIASTOLIC BLOOD PRESSURE: 60 MMHG | HEART RATE: 80 BPM | SYSTOLIC BLOOD PRESSURE: 122 MMHG | OXYGEN SATURATION: 97 % | RESPIRATION RATE: 14 BRPM

## 2021-01-11 VITALS — BODY MASS INDEX: 24.86 KG/M2 | HEIGHT: 68 IN | WEIGHT: 164 LBS

## 2021-01-11 PROCEDURE — 99072 ADDL SUPL MATRL&STAF TM PHE: CPT

## 2021-01-11 PROCEDURE — 94729 DIFFUSING CAPACITY: CPT

## 2021-01-11 PROCEDURE — 94727 GAS DIL/WSHOT DETER LNG VOL: CPT

## 2021-01-11 PROCEDURE — 85018 HEMOGLOBIN: CPT | Mod: QW

## 2021-01-11 PROCEDURE — 99214 OFFICE O/P EST MOD 30 MIN: CPT | Mod: 25

## 2021-01-11 PROCEDURE — 94010 BREATHING CAPACITY TEST: CPT

## 2021-01-11 RX ORDER — ENALAPRIL MALEATE 20 MG/1
20 TABLET ORAL
Qty: 180 | Refills: 0 | Status: ACTIVE | COMMUNITY
Start: 2020-11-03

## 2021-01-11 NOTE — HISTORY OF PRESENT ILLNESS
[TextBox_4] : never smoker\par doing well, no fever, chill, chest pain\par no dyspnea\par sent for mild pulmonary hypertension\par has ED on cpap\par Cardiology Dr Poon [facility]\par recently saw vascular, no DVT

## 2021-01-11 NOTE — DISCUSSION/SUMMARY
[FreeTextEntry1] : asthma mild intermittent, prn rescue, stable minute nodules by hx, last CXR 5/20 neg\par post total prostatectomy, complicated by seroma with intermittent drainage\par never smoker, no pulmonary complaints\par lung exam is normal, normal sp02\par per pt borderline pulmonary htn by echocardiogram , needs repeat \par has ED, compliant with CPAP, \par discussed CTA, perfusion scan, he is not interested currently, recent vacular work up negative for DVT\par Pfst reviewed, mild obstruction, normal flows, normal TLC and DLCO\par He will contact if any change in status\par vaccinations this fall, 6 months or sooner if needed

## 2021-01-11 NOTE — CONSULT LETTER
[Dear  ___] : Dear  [unfilled], [Consult Letter:] : I had the pleasure of evaluating your patient, [unfilled]. [Please see my note below.] : Please see my note below. [Sincerely,] : Sincerely, [FreeTextEntry3] : Crow [facility] DO [facility]\par Pulmonary Critical Care\par Director Pulmonary Division\par Medical Director Respiratory Therapy\par [facility]\par \par

## 2021-01-11 NOTE — REVIEW OF SYSTEMS
[Anemia] : anemia [Negative] : Psychiatric [TextBox_30] : see HPI [TextBox_44] : see HPI [TextBox_83] : post prostate surgery

## 2021-01-11 NOTE — PHYSICAL EXAM
[No Acute Distress] : no acute distress [Normal Rate/Rhythm] : normal rate/rhythm [Normal S1, S2] : normal s1, s2 [No Murmurs] : no murmurs [No Resp Distress] : no resp distress [No Acc Muscle Use] : no acc muscle use [Clear to Auscultation Bilaterally] : clear to auscultation bilaterally [No Abnormalities] : no abnormalities [Normal Gait] : normal gait [No Clubbing] : no clubbing [TextBox_105] : 1 plus edema

## 2021-02-08 ENCOUNTER — APPOINTMENT (OUTPATIENT)
Dept: SURGERY | Facility: CLINIC | Age: 76
End: 2021-02-08
Payer: MEDICARE

## 2021-02-08 VITALS
WEIGHT: 165 LBS | HEART RATE: 70 BPM | RESPIRATION RATE: 14 BRPM | TEMPERATURE: 97.4 F | OXYGEN SATURATION: 96 % | HEIGHT: 68 IN | BODY MASS INDEX: 25.01 KG/M2 | SYSTOLIC BLOOD PRESSURE: 152 MMHG | DIASTOLIC BLOOD PRESSURE: 79 MMHG

## 2021-02-08 PROCEDURE — 99214 OFFICE O/P EST MOD 30 MIN: CPT

## 2021-02-08 PROCEDURE — 99072 ADDL SUPL MATRL&STAF TM PHE: CPT

## 2021-02-08 NOTE — PHYSICAL EXAM
[JVD] : no jugular venous distention  [No Rash or Lesion] : No rash or lesion [Purpura] : no purpura  [Skin Ulcer] : no ulcer [Petechiae] : no petechiae [Skin Induration] : no induration [Alert] : alert [Oriented to Person] : oriented to person [Oriented to Place] : oriented to place [Oriented to Time] : oriented to time [Calm] : calm [de-identified] : non toxic in no acute distress [de-identified] : NC/AT PERRL EOMI no scleral icterus [de-identified] : trachea midline no gross mass [de-identified] : no audible wheezing or stridor [de-identified] : phallus normal, no testicular mass or tenderness [de-identified] : mildly obese soft, no localizing tenderness, no guarding, no rebound, no masses [de-identified] : FROM of all extremities with no gross deformity, there remains no recurrent umbilical or inguinal hernias and no localizing tenderness, there is a new IR drain in the right groin area with no gross pus in the collection chamber  [de-identified] : surgical incisions are healed  [de-identified] : mood is calm

## 2021-02-08 NOTE — DATA REVIEWED
[FreeTextEntry1] : Independent review of the abdominal ultrasound reveals a 2.5 cm irregularly shape density in the gallbladder lumen consistent with a ball of sludge.

## 2021-02-08 NOTE — HISTORY OF PRESENT ILLNESS
[de-identified] : The patient returns to the office with complaints of abdominal discomfort and bloating.  He has been having intermittent abdominal discomfort since his prostatectomy complicated by a postoperative seroma that required IR drainage to get to resolution.  The patient still has a right lower pelvic drain in place and on sono follow up was found to have a large ball of sludge in the gallbladder.

## 2021-02-08 NOTE — ASSESSMENT
[FreeTextEntry1] : The patient is a 75 year old male with prostate cancer s/p robotic prostatectomy complicated by a post operative seroma requiring IR drainage.  The patient was noted on ultrasound evaluation to have a 2.5 cm sludge collection in the gallbladder. The patient has been having intermittent abdominal discomfort and bloating.  He was advised that he will likely benefit from a cholecystectomy given the size of the collection and the symptoms that he has been having. I suggested that we await removal of the drain and follow up of the collection prior to proceeding with cholecystectomy.   The patient will follow up in 4 weeks or sooner should any problems or issues arise.

## 2021-03-08 ENCOUNTER — APPOINTMENT (OUTPATIENT)
Dept: SURGERY | Facility: CLINIC | Age: 76
End: 2021-03-08
Payer: MEDICARE

## 2021-03-08 VITALS
DIASTOLIC BLOOD PRESSURE: 74 MMHG | WEIGHT: 163 LBS | SYSTOLIC BLOOD PRESSURE: 149 MMHG | HEART RATE: 68 BPM | OXYGEN SATURATION: 99 % | BODY MASS INDEX: 23.34 KG/M2 | HEIGHT: 70 IN

## 2021-03-08 PROCEDURE — 99072 ADDL SUPL MATRL&STAF TM PHE: CPT

## 2021-03-08 PROCEDURE — 99214 OFFICE O/P EST MOD 30 MIN: CPT

## 2021-03-08 NOTE — HISTORY OF PRESENT ILLNESS
[de-identified] : The patient returns to the office with more frequent episodes of right upper abdominal discomfort and bloating.   He reports that about one week ago he had such severe pain that he went to the ER at [place] for evaluation thinking he was having an MI.  He was treated and released.  He states that the bloating and discomfort is worse following meals.

## 2021-03-08 NOTE — ASSESSMENT
[FreeTextEntry1] : The patient is a 75 year old male with abdominal bloating and right upper abdominal discomfort with a sludge ball versus stone in the gallbladder.  He was advised that he will benefit from a cholecystectomy.  The risks, benefits, and alternatives including the option of doing nothing to a robotic, possible laparoscopic cholecystectomy, possible open cholecystectomy were discussed.  The potential complications including but not limited to infection, bleeding, bile leak, bowel injury and/or obstruction, and possible bile duct injury were discussed.  The patient understands and wishes to proceed at the next available time.\par

## 2021-03-08 NOTE — PHYSICAL EXAM
[JVD] : no jugular venous distention  [No Rash or Lesion] : No rash or lesion [Purpura] : no purpura  [Petechiae] : no petechiae [Skin Ulcer] : no ulcer [Skin Induration] : no induration [Alert] : alert [Oriented to Person] : oriented to person [Oriented to Place] : oriented to place [Oriented to Time] : oriented to time [Calm] : calm [de-identified] : non toxic in no acute distress [de-identified] : NC/AT PERRL EOMI no scleral icterus [de-identified] : trachea midline no gross mass [de-identified] : no audible wheezing or stridor [de-identified] : mildly obese soft, mild right upper abdominal tenderness to deep palpation, no guarding, no rebound, no masses [de-identified] : phallus normal, no testicular mass or tenderness [de-identified] : FROM of all extremities with no gross deformity, there remains no recurrent umbilical or inguinal hernias and no localizing tenderness, there is a new IR drain in the right groin area with no gross pus in the collection chamber  [de-identified] : surgical incisions are healed  [de-identified] : mood is calm

## 2021-07-12 ENCOUNTER — APPOINTMENT (OUTPATIENT)
Dept: SURGERY | Facility: CLINIC | Age: 76
End: 2021-07-12
Payer: MEDICARE

## 2021-07-12 VITALS
BODY MASS INDEX: 22.62 KG/M2 | RESPIRATION RATE: 14 BRPM | HEART RATE: 71 BPM | WEIGHT: 158 LBS | OXYGEN SATURATION: 98 % | SYSTOLIC BLOOD PRESSURE: 153 MMHG | DIASTOLIC BLOOD PRESSURE: 76 MMHG | HEIGHT: 70 IN | TEMPERATURE: 97.1 F

## 2021-07-12 VITALS — DIASTOLIC BLOOD PRESSURE: 82 MMHG | SYSTOLIC BLOOD PRESSURE: 153 MMHG

## 2021-07-12 PROCEDURE — 99213 OFFICE O/P EST LOW 20 MIN: CPT

## 2021-07-12 NOTE — ASSESSMENT
[FreeTextEntry1] : The patient is a 76 year old male with abdominal bloating and nausea.  The patient is to undergo a CT scan to follow up on the seroma he had in the right lower abdominal area.  The patient will follow up upon completion of the CT scan.  He will also undergo a follow up abdominal ultrasound to reassess the gallbladder. A total of 20 minutes was spent coordinating the care of the patient.

## 2021-07-12 NOTE — HISTORY OF PRESENT ILLNESS
[de-identified] : The patient returns to the office with complaints of abdominal bloating and nausea.  He is reluctant at this point to proceed with surgery.  The patient at this time states that the symptoms do not change despite diet restriction.

## 2021-07-14 ENCOUNTER — APPOINTMENT (OUTPATIENT)
Dept: PULMONOLOGY | Facility: CLINIC | Age: 76
End: 2021-07-14

## 2021-07-14 ENCOUNTER — EMERGENCY (EMERGENCY)
Facility: HOSPITAL | Age: 76
LOS: 1 days | Discharge: HOME | End: 2021-07-14
Attending: EMERGENCY MEDICINE
Payer: COMMERCIAL

## 2021-07-14 VITALS
SYSTOLIC BLOOD PRESSURE: 141 MMHG | DIASTOLIC BLOOD PRESSURE: 80 MMHG | HEIGHT: 70 IN | RESPIRATION RATE: 18 BRPM | TEMPERATURE: 98 F | WEIGHT: 160.06 LBS | OXYGEN SATURATION: 97 % | HEART RATE: 77 BPM

## 2021-07-14 DIAGNOSIS — K80.50 CALCULUS OF BILE DUCT WITHOUT CHOLANGITIS OR CHOLECYSTITIS WITHOUT OBSTRUCTION: ICD-10-CM

## 2021-07-14 DIAGNOSIS — Z98.890 OTHER SPECIFIED POSTPROCEDURAL STATES: Chronic | ICD-10-CM

## 2021-07-14 DIAGNOSIS — Z98.49 CATARACT EXTRACTION STATUS, UNSPECIFIED EYE: Chronic | ICD-10-CM

## 2021-07-14 DIAGNOSIS — Z98.89 OTHER SPECIFIED POSTPROCEDURAL STATES: Chronic | ICD-10-CM

## 2021-07-14 LAB
ALBUMIN SERPL ELPH-MCNC: 4.4 G/DL — SIGNIFICANT CHANGE UP (ref 3.3–5.2)
ALP SERPL-CCNC: 93 U/L — SIGNIFICANT CHANGE UP (ref 40–120)
ALT FLD-CCNC: 44 U/L — HIGH
ANION GAP SERPL CALC-SCNC: 11 MMOL/L — SIGNIFICANT CHANGE UP (ref 5–17)
AST SERPL-CCNC: 80 U/L — HIGH
BASOPHILS # BLD AUTO: 0.02 K/UL — SIGNIFICANT CHANGE UP (ref 0–0.2)
BASOPHILS NFR BLD AUTO: 0.2 % — SIGNIFICANT CHANGE UP (ref 0–2)
BILIRUB SERPL-MCNC: 0.5 MG/DL — SIGNIFICANT CHANGE UP (ref 0.4–2)
BUN SERPL-MCNC: 16.8 MG/DL — SIGNIFICANT CHANGE UP (ref 8–20)
CALCIUM SERPL-MCNC: 9.4 MG/DL — SIGNIFICANT CHANGE UP (ref 8.6–10.2)
CHLORIDE SERPL-SCNC: 103 MMOL/L — SIGNIFICANT CHANGE UP (ref 98–107)
CO2 SERPL-SCNC: 25 MMOL/L — SIGNIFICANT CHANGE UP (ref 22–29)
CREAT SERPL-MCNC: 0.83 MG/DL — SIGNIFICANT CHANGE UP (ref 0.5–1.3)
EOSINOPHIL # BLD AUTO: 0.03 K/UL — SIGNIFICANT CHANGE UP (ref 0–0.5)
EOSINOPHIL NFR BLD AUTO: 0.4 % — SIGNIFICANT CHANGE UP (ref 0–6)
GLUCOSE SERPL-MCNC: 110 MG/DL — HIGH (ref 70–99)
HCT VFR BLD CALC: 41.5 % — SIGNIFICANT CHANGE UP (ref 39–50)
HGB BLD-MCNC: 14.3 G/DL — SIGNIFICANT CHANGE UP (ref 13–17)
IMM GRANULOCYTES NFR BLD AUTO: 0.4 % — SIGNIFICANT CHANGE UP (ref 0–1.5)
LIDOCAIN IGE QN: 49 U/L — SIGNIFICANT CHANGE UP (ref 22–51)
LYMPHOCYTES # BLD AUTO: 0.94 K/UL — LOW (ref 1–3.3)
LYMPHOCYTES # BLD AUTO: 11.2 % — LOW (ref 13–44)
MCHC RBC-ENTMCNC: 32 PG — SIGNIFICANT CHANGE UP (ref 27–34)
MCHC RBC-ENTMCNC: 34.5 GM/DL — SIGNIFICANT CHANGE UP (ref 32–36)
MCV RBC AUTO: 92.8 FL — SIGNIFICANT CHANGE UP (ref 80–100)
MONOCYTES # BLD AUTO: 0.58 K/UL — SIGNIFICANT CHANGE UP (ref 0–0.9)
MONOCYTES NFR BLD AUTO: 6.9 % — SIGNIFICANT CHANGE UP (ref 2–14)
NEUTROPHILS # BLD AUTO: 6.82 K/UL — SIGNIFICANT CHANGE UP (ref 1.8–7.4)
NEUTROPHILS NFR BLD AUTO: 80.9 % — HIGH (ref 43–77)
PLATELET # BLD AUTO: 154 K/UL — SIGNIFICANT CHANGE UP (ref 150–400)
POTASSIUM SERPL-MCNC: 4.5 MMOL/L — SIGNIFICANT CHANGE UP (ref 3.5–5.3)
POTASSIUM SERPL-SCNC: 4.5 MMOL/L — SIGNIFICANT CHANGE UP (ref 3.5–5.3)
PROT SERPL-MCNC: 7.4 G/DL — SIGNIFICANT CHANGE UP (ref 6.6–8.7)
RBC # BLD: 4.47 M/UL — SIGNIFICANT CHANGE UP (ref 4.2–5.8)
RBC # FLD: 12.4 % — SIGNIFICANT CHANGE UP (ref 10.3–14.5)
SODIUM SERPL-SCNC: 139 MMOL/L — SIGNIFICANT CHANGE UP (ref 135–145)
WBC # BLD: 8.42 K/UL — SIGNIFICANT CHANGE UP (ref 3.8–10.5)
WBC # FLD AUTO: 8.42 K/UL — SIGNIFICANT CHANGE UP (ref 3.8–10.5)

## 2021-07-14 PROCEDURE — 36415 COLL VENOUS BLD VENIPUNCTURE: CPT

## 2021-07-14 PROCEDURE — 99284 EMERGENCY DEPT VISIT MOD MDM: CPT | Mod: 25

## 2021-07-14 PROCEDURE — 99285 EMERGENCY DEPT VISIT HI MDM: CPT

## 2021-07-14 PROCEDURE — 83690 ASSAY OF LIPASE: CPT

## 2021-07-14 PROCEDURE — 80053 COMPREHEN METABOLIC PANEL: CPT

## 2021-07-14 PROCEDURE — 99282 EMERGENCY DEPT VISIT SF MDM: CPT | Mod: GC

## 2021-07-14 PROCEDURE — 76705 ECHO EXAM OF ABDOMEN: CPT

## 2021-07-14 PROCEDURE — 85025 COMPLETE CBC W/AUTO DIFF WBC: CPT

## 2021-07-14 PROCEDURE — 76705 ECHO EXAM OF ABDOMEN: CPT | Mod: 26

## 2021-07-14 NOTE — ED ADULT TRIAGE NOTE - CHIEF COMPLAINT QUOTE
pt arrives to ED with c/o RUQ pain, nausea. pt reports he spoke to MD Shaikh, hx gall bladder issues

## 2021-07-14 NOTE — ED ADULT NURSE NOTE - NSIMPLEMENTINTERV_GEN_ALL_ED
Implemented All Universal Safety Interventions:  [place] to call system. Call bell, personal items and telephone within reach. Instruct patient to call for assistance. Room bathroom lighting operational. Non-slip footwear when patient is off stretcher. Physically safe environment: no spills, clutter or unnecessary equipment. Stretcher in lowest position, wheels locked, appropriate side rails in place.

## 2021-07-14 NOTE — ED PROVIDER NOTE - ATTENDING CONTRIBUTION TO CARE
I, Dr. Ladd, performed the initial face to face bedside interview with this patient regarding history of present illness, review of symptoms and relevant past medical, social and family history.  I completed an independent physical examination.  I was the initial provider who evaluated this patient. I have signed out the follow up of any pending tests (i.e. labs, radiological studies) to the ACP.  I have communicated the patient’s plan of care and disposition with the ACP.

## 2021-07-14 NOTE — ED PROVIDER NOTE - CLINICAL SUMMARY MEDICAL DECISION MAKING FREE TEXT BOX
Patient with abdominal pain and hx of gallbladder issues. Will get Ultrasound, check labs and reassess.

## 2021-07-14 NOTE — ED PROVIDER NOTE - NSFOLLOWUPINSTRUCTIONS_ED_ALL_ED_FT
Follow up with surgery team.  Come back with new or worsening symptoms.    Gallstones    Gallstones (cholelithiasis) is a form of gallbladder disease in which stones form in your gallbladder. The gallbladder is an organ that stores bile made in the liver, which helps digest fats. Gallstones begin as small bile crystals and slowly grow into stones. Gallstone pain occurs when the gallbladder spasms and a gallstone or sludge is blocking the duct. Pain can also occur when a stone passes out of the duct. Only take over-the-counter or prescription medicines for pain, discomfort, or fever as directed by your health care provider. Follow a low-fat diet until seen again by your health care provider.     SEEK IMMEDIATE MEDICAL CARE IF YOU HAVE ANY OF THE FOLLOWING SYMPTOMS: worsening pain, fever, persistent vomiting, yellowing of the skin or eyes, or altered mental status.

## 2021-07-14 NOTE — CONSULT NOTE ADULT - SUBJECTIVE AND OBJECTIVE BOX
ACUTE CARE SURGERY CONSULT    HPI:  77 y/o male with PMHx of HTN, Colon cancer, CD (celiac disease). bilateral Inguinal and umbilical hernia presents to ED c/o abdominal pain. Earlier today patent ate grilled chicken sandwich and developed epigastric abdominal. Had been followed by Dr. Pace outpatient for evaluation of his gallbladder and plan for removal. Drove to Dr. Pace's office and pain shifted to RUQ before resolving. Associated with sweats, nausea, and retching, denies vomiting, fever, chills, chest pain, or SOB.             PAST MEDICAL HISTORY:  Hypertension    ED (Obstructive Sleep Apnea)    BPH (Benign Prostatic Hypertrophy)    Lumbar radiculopathy    CD (celiac disease)    Lung nodule seen on imaging study    H/O thyroid nodule    Acid reflux disease    Inguinal hernia without obstruction or gangrene, recurrence not specified, unspecified laterality    Umbilical hernia without obstruction or gangrene    Inguinal hernia    Prostate cancer        PAST SURGICAL HISTORY:  ORIF Right Radius    History of Tonsillectomy    S/P cervical discectomy    History of inguinal herniorrhaphy    Cataract extraction status    H/O arthroscopy of left knee        ALLERGIES:  Gluten (Other)  sulfa drugs (Hives; Urticaria)      FAMILY HISTORY: Noncontributory    SOCIAL HISTORY: Denies tobacco, EtOH, illicit substance use.     HOME MEDICATIONS:    MEDICATIONS  (STANDING):    MEDICATIONS  (PRN):      VITALS & I/Os:  Vital Signs Last 24 Hrs  T(C): 36.9 (14 Jul 2021 15:12), Max: 36.9 (14 Jul 2021 15:12)  T(F): 98.5 (14 Jul 2021 15:12), Max: 98.5 (14 Jul 2021 15:12)  HR: 77 (14 Jul 2021 15:12) (77 - 77)  BP: 141/80 (14 Jul 2021 15:12) (141/80 - 141/80)  BP(mean): --  RR: 18 (14 Jul 2021 15:12) (18 - 18)  SpO2: 97% (14 Jul 2021 15:12) (97% - 97%)  CAPILLARY BLOOD GLUCOSE          I&O's Summary      GENERAL: Alert, well developed, in no acute distress.  MENTAL STATUS: AAOx3. Appropriate affect.  HEENT:  EOMI. MMM. Supple, full ROM  RESPIRATORY: Unlabored, no conversational dyspnea  CARDIOVASCULAR: RRR. No audible murmurs, rubs or gallops.   GASTROINTESTINAL: Abdomen soft, NT, ND, -R/-G.    NEUROLOGIC: Cranial nerves II-XII grossly intact. No focal neurological deficits. Moves all extremities spontaneously. Sensation intact bilaterally.  INTEGUMENTARY: No overt rashes or lesions, petechia or purpura. Good turgor. No edema.  MUSCULOSKELETAL: No cyanosis or clubbing. No gross deformities.         LABS:                        14.3   8.42  )-----------( 154      ( 14 Jul 2021 17:01 )             41.5     07-14    139  |  103  |  16.8  ----------------------------<  110<H>  4.5   |  25.0  |  0.83    Ca    9.4      14 Jul 2021 17:01    TPro  7.4  /  Alb  4.4  /  TBili  0.5  /  DBili  x   /  AST  80<H>  /  ALT  44<H>  /  AlkPhos  93  07-14    Lactate:        IMAGING:  RUQ U/S  IMPRESSION:  Echogenic material in the gallbladder may represent tumefactive sludge or stones. Mass is not definitively excluded. If clinically indicated, MRI can be performed for further evaluation. Negative sonographic Santos sign. --- End of Report     ULISES KHANNA MD; Attending Radiologist  This document has been electronically signed. Jul 14 2021 6:11PM

## 2021-07-14 NOTE — ED PROVIDER NOTE - PROGRESS NOTE DETAILS
PT evaluated by intake physician. HPI/PE/ROS as noted above. Will follow up plan per intake physician. Pts results reviewed and surg consulted. Surgery saw pt and recommends f/u out outpatient. pt given return precautions. Will dc. Pt seen by surgery and recommends outpt f/u for GB surgery. Will dc with f/u. return precautions given.

## 2021-07-14 NOTE — CONSULT NOTE ADULT - ASSESSMENT
77yo M with complex surgical history presents with abdominal pain that was being followed and evaluated outpatient by Dr. Pace. U/S shows known findings of sludge in gallbladder without any signs of cholecystitis Afebrile, no leukocytosis. Pain has resolved on examination. No urgent indication for surgery at this time.     - Patient will be scheduled for outpatient robotic cholecystectomy, to be called by Dr. Pace's office.  - Okay for discharge. Instructed to return to ED if pain returns, worsens, or persists.    Patient seen and plan discussed with Dr. Pace who agrees.   Consulted at 19:22, seen initially at 19:45

## 2021-07-14 NOTE — ED PROVIDER NOTE - PROVIDER TOKENS
Medical Record reviewed.  Yandy Alexander was discharged from [facility] on 7/10/2019.  30 day end date: 8/9/2019  Spoke to Yandy Alexander in follow-up to hospital stay.  Since discharge patient has been feeling good.   Ambulation/Activity:without difficulty   Appetite: Normal   Pain: no pain denies pain, fevers, sign/syptoms of infection, denies shortness of breath  Appointments: Reviewed her appointments yes was able to verbalize follow up dates and times.   Patient Education: None   Patient did not have further questions or concerns.  Next patient outreach encounter scheduled.  Please call if you have any questions.  Cb Dorsey, RN, MSN  Transitional Care RN  [phone]   PROVIDER:[TOKEN:[[id number]]]

## 2021-07-14 NOTE — ED ADULT TRIAGE NOTE - SPO2 (%)
Please call patient regarding medication. Could any of his medications cause gout/ extreme fatigue/Shortness of breath.     [facility]    [phone]    Thanks  Nikki Rosa 97

## 2021-07-14 NOTE — ED ADULT NURSE NOTE - OBJECTIVE STATEMENT
76y male AOx4 c/o RUQ pain, hx of gallbladder sludge, follows up with MD Neel shelby. pt states pain started @ approx 12pm after eating grilled chicken sandwich. pt endorses some nausea, denies need for nausea or pain medication at this time. respirations even and unlabored. denies chest discomfort. abd soft and nondistended. skin WNL for race.

## 2021-07-14 NOTE — ED PROVIDER NOTE - CARE PROVIDER_API CALL
Colt Pace (MD)  Surgery  [address], [address] [place] [address]  Phone: [phone]  Fax: [phone]  Follow Up Time:

## 2021-07-14 NOTE — ED PROVIDER NOTE - PATIENT PORTAL LINK FT
You can access the FollowMyHealth Patient Portal offered by [facility] by registering at the following website: [email or web address]. By joining [facility]’s FollowMyHealth portal, you will also be able to view your health information using other applications (apps) compatible with our system.

## 2021-07-14 NOTE — ASU DISCHARGE PLAN (ADULT/PEDIATRIC). - ACTIVITY LEVEL
[Follow-Up Visit] : a follow-up no sports/gym/max lifting capacity 20 pounds/no exercise/no heavy lifting/no intercourse/no tub baths [Spouse] : spouse [FreeTextEntry2] : breast cancer

## 2021-07-14 NOTE — CONSULT NOTE ADULT - ATTENDING COMMENTS
Agree with above assessment.   The patient was seen and examined by me.  The patient is well known to me from the outpatient setting.  The patient has a known history of gallbladder sludge.  The patient was to be scheduled for cholecystectomy in Feb 2021, but failed to follow thru at that time.  The patient was seen in the office 2 days ago.  The patient presented to the ER today with the onset of mid abdominal pain after eating chicken earlier today.  The patient on exam is without scleral icterus, PERRL EOMI, trachea midline, no JVD, chest b/l air entry, abdomen is soft without localizing tenderness, no guarding, no rebound, no masses.  The patient had an ultrasound revealing sludge ans stones.  The patient is going to benefit from an eventual cholecystectomy.  The patient will be scheduled at the next available time.

## 2021-07-14 NOTE — ED PROVIDER NOTE - OBJECTIVE STATEMENT
75 y/o male with PMHx of HTN, Colon cancer, CD (celiac disease). Inguinal and umbilical hernia presents to ED c/o abdominal pain. Patient reports problems, nausea and pain since Monday. Patient's pain reignited today so patient believes it is a gall bladder attack. In May of 2020 he was diagnosed with a sludge ball in gall bladder. Patient was eating a grilled chicken sandwich when the pain started suddenly in his lower abdomen than radiated to the right side.     Denies: Vomiting, chest pain, fever

## 2021-07-19 ENCOUNTER — APPOINTMENT (OUTPATIENT)
Dept: ULTRASOUND IMAGING | Facility: CLINIC | Age: 76
End: 2021-07-19

## 2021-08-02 ENCOUNTER — APPOINTMENT (OUTPATIENT)
Dept: SURGERY | Facility: CLINIC | Age: 76
End: 2021-08-02
Payer: MEDICARE

## 2021-08-02 VITALS
DIASTOLIC BLOOD PRESSURE: 74 MMHG | BODY MASS INDEX: 22.76 KG/M2 | OXYGEN SATURATION: 98 % | WEIGHT: 159 LBS | SYSTOLIC BLOOD PRESSURE: 134 MMHG | HEART RATE: 66 BPM | HEIGHT: 70 IN | TEMPERATURE: 97.5 F

## 2021-08-02 PROCEDURE — 99214 OFFICE O/P EST MOD 30 MIN: CPT

## 2021-08-02 NOTE — DATA REVIEWED
[FreeTextEntry1] : Independent review of the abdominal ultrasound reveals the presence of sludge and stones\par \par Independent review of the abd/pel CT scan reveals resolution of the prior bilateral pelvic seromas, no recurrent hernias

## 2021-08-02 NOTE — PHYSICAL EXAM
[JVD] : no jugular venous distention  [No Rash or Lesion] : No rash or lesion [Purpura] : no purpura  [Petechiae] : no petechiae [Skin Ulcer] : no ulcer [Skin Induration] : no induration [Alert] : alert [Oriented to Person] : oriented to person [Oriented to Place] : oriented to place [Oriented to Time] : oriented to time [Calm] : calm [de-identified] : non toxic in no acute distress [de-identified] : NC/AT PERRL EOMI no scleral icterus [de-identified] : trachea midline no gross mass [de-identified] : no audible wheezing or stridor [de-identified] : mildly obese soft, no localizing tenderness, no guarding, no rebound, no masses [de-identified] : phallus normal, no testicular mass or tenderness [de-identified] : FROM of all extremities with no gross deformity, there remains no recurrent umbilical or inguinal hernias and no localizing tenderness [de-identified] : surgical incisions are healed  [de-identified] : mood is calm

## 2021-08-02 NOTE — HISTORY OF PRESENT ILLNESS
[de-identified] : The patient returns to the office with intermittent bouts of abdominal discomfort and bloating.  He states that the symptoms are worse after heavy meals.

## 2021-08-09 ENCOUNTER — OUTPATIENT (OUTPATIENT)
Dept: OUTPATIENT SERVICES | Facility: HOSPITAL | Age: 76
LOS: 1 days | End: 2021-08-09
Payer: COMMERCIAL

## 2021-08-09 VITALS
HEIGHT: 70 IN | WEIGHT: 156.53 LBS | SYSTOLIC BLOOD PRESSURE: 128 MMHG | HEART RATE: 64 BPM | RESPIRATION RATE: 17 BRPM | TEMPERATURE: 97 F | DIASTOLIC BLOOD PRESSURE: 80 MMHG

## 2021-08-09 DIAGNOSIS — Z98.49 CATARACT EXTRACTION STATUS, UNSPECIFIED EYE: Chronic | ICD-10-CM

## 2021-08-09 DIAGNOSIS — Z71.89 OTHER SPECIFIED COUNSELING: ICD-10-CM

## 2021-08-09 DIAGNOSIS — Z98.890 OTHER SPECIFIED POSTPROCEDURAL STATES: Chronic | ICD-10-CM

## 2021-08-09 DIAGNOSIS — Z01.818 ENCOUNTER FOR OTHER PREPROCEDURAL EXAMINATION: ICD-10-CM

## 2021-08-09 DIAGNOSIS — Z29.9 ENCOUNTER FOR PROPHYLACTIC MEASURES, UNSPECIFIED: ICD-10-CM

## 2021-08-09 DIAGNOSIS — Z98.89 OTHER SPECIFIED POSTPROCEDURAL STATES: Chronic | ICD-10-CM

## 2021-08-09 LAB
A1C WITH ESTIMATED AVERAGE GLUCOSE RESULT: 4.7 % — SIGNIFICANT CHANGE UP (ref 4–5.6)
ALBUMIN SERPL ELPH-MCNC: 4.2 G/DL — SIGNIFICANT CHANGE UP (ref 3.3–5.2)
ALP SERPL-CCNC: 73 U/L — SIGNIFICANT CHANGE UP (ref 40–120)
ALT FLD-CCNC: 14 U/L — SIGNIFICANT CHANGE UP
AMYLASE P1 CFR SERPL: 113 U/L — SIGNIFICANT CHANGE UP (ref 36–128)
ANION GAP SERPL CALC-SCNC: 8 MMOL/L — SIGNIFICANT CHANGE UP (ref 5–17)
APTT BLD: 32.6 SEC — SIGNIFICANT CHANGE UP (ref 27.5–35.5)
AST SERPL-CCNC: 26 U/L — SIGNIFICANT CHANGE UP
BASOPHILS # BLD AUTO: 0.03 K/UL — SIGNIFICANT CHANGE UP (ref 0–0.2)
BASOPHILS NFR BLD AUTO: 0.7 % — SIGNIFICANT CHANGE UP (ref 0–2)
BILIRUB SERPL-MCNC: 0.5 MG/DL — SIGNIFICANT CHANGE UP (ref 0.4–2)
BLD GP AB SCN SERPL QL: SIGNIFICANT CHANGE UP
BUN SERPL-MCNC: 14.5 MG/DL — SIGNIFICANT CHANGE UP (ref 8–20)
CALCIUM SERPL-MCNC: 8.9 MG/DL — SIGNIFICANT CHANGE UP (ref 8.6–10.2)
CHLORIDE SERPL-SCNC: 103 MMOL/L — SIGNIFICANT CHANGE UP (ref 98–107)
CO2 SERPL-SCNC: 27 MMOL/L — SIGNIFICANT CHANGE UP (ref 22–29)
CREAT SERPL-MCNC: 0.88 MG/DL — SIGNIFICANT CHANGE UP (ref 0.5–1.3)
EOSINOPHIL # BLD AUTO: 0.09 K/UL — SIGNIFICANT CHANGE UP (ref 0–0.5)
EOSINOPHIL NFR BLD AUTO: 2.1 % — SIGNIFICANT CHANGE UP (ref 0–6)
ESTIMATED AVERAGE GLUCOSE: 88 MG/DL — SIGNIFICANT CHANGE UP (ref 68–114)
GLUCOSE SERPL-MCNC: 91 MG/DL — SIGNIFICANT CHANGE UP (ref 70–99)
HCT VFR BLD CALC: 38.8 % — LOW (ref 39–50)
HGB BLD-MCNC: 13.3 G/DL — SIGNIFICANT CHANGE UP (ref 13–17)
IMM GRANULOCYTES NFR BLD AUTO: 0.2 % — SIGNIFICANT CHANGE UP (ref 0–1.5)
INR BLD: 1 RATIO — SIGNIFICANT CHANGE UP (ref 0.88–1.16)
LIDOCAIN IGE QN: 36 U/L — SIGNIFICANT CHANGE UP (ref 22–51)
LYMPHOCYTES # BLD AUTO: 1.24 K/UL — SIGNIFICANT CHANGE UP (ref 1–3.3)
LYMPHOCYTES # BLD AUTO: 28.8 % — SIGNIFICANT CHANGE UP (ref 13–44)
MCHC RBC-ENTMCNC: 32.5 PG — SIGNIFICANT CHANGE UP (ref 27–34)
MCHC RBC-ENTMCNC: 34.3 GM/DL — SIGNIFICANT CHANGE UP (ref 32–36)
MCV RBC AUTO: 94.9 FL — SIGNIFICANT CHANGE UP (ref 80–100)
MONOCYTES # BLD AUTO: 0.5 K/UL — SIGNIFICANT CHANGE UP (ref 0–0.9)
MONOCYTES NFR BLD AUTO: 11.6 % — SIGNIFICANT CHANGE UP (ref 2–14)
NEUTROPHILS # BLD AUTO: 2.43 K/UL — SIGNIFICANT CHANGE UP (ref 1.8–7.4)
NEUTROPHILS NFR BLD AUTO: 56.6 % — SIGNIFICANT CHANGE UP (ref 43–77)
PLATELET # BLD AUTO: 146 K/UL — LOW (ref 150–400)
POTASSIUM SERPL-MCNC: 4.2 MMOL/L — SIGNIFICANT CHANGE UP (ref 3.5–5.3)
POTASSIUM SERPL-SCNC: 4.2 MMOL/L — SIGNIFICANT CHANGE UP (ref 3.5–5.3)
PROT SERPL-MCNC: 6.8 G/DL — SIGNIFICANT CHANGE UP (ref 6.6–8.7)
PROTHROM AB SERPL-ACNC: 11.6 SEC — SIGNIFICANT CHANGE UP (ref 10.6–13.6)
RBC # BLD: 4.09 M/UL — LOW (ref 4.2–5.8)
RBC # FLD: 12.2 % — SIGNIFICANT CHANGE UP (ref 10.3–14.5)
SODIUM SERPL-SCNC: 138 MMOL/L — SIGNIFICANT CHANGE UP (ref 135–145)
WBC # BLD: 4.3 K/UL — SIGNIFICANT CHANGE UP (ref 3.8–10.5)
WBC # FLD AUTO: 4.3 K/UL — SIGNIFICANT CHANGE UP (ref 3.8–10.5)

## 2021-08-09 PROCEDURE — 93005 ELECTROCARDIOGRAM TRACING: CPT

## 2021-08-09 PROCEDURE — 93010 ELECTROCARDIOGRAM REPORT: CPT

## 2021-08-09 PROCEDURE — G0463: CPT

## 2021-08-09 RX ORDER — OXYBUTYNIN CHLORIDE 5 MG
1 TABLET ORAL
Qty: 0 | Refills: 0 | DISCHARGE

## 2021-08-09 RX ORDER — OMEGA-3 ACID ETHYL ESTERS 1 G
1 CAPSULE ORAL
Qty: 0 | Refills: 0 | DISCHARGE

## 2021-08-09 NOTE — H&P PST ADULT - HISTORY OF PRESENT ILLNESS
MARCELLUS LAWSON  is a 77 y/o male aox3 PMH of prostates cancer .Patient c/o right leg edema and testicles groin , patient pain radiates from the right groin to the right testicle Patient went for a evaluation      MARCELLUS LAWSON is a 76 year old male being seen for a abdominal bloating discomfort and gallstones follow-up visit.     The patient comes to the office in consultation by Dr. Jose Serrano for evaluation of right groin pain and a lump in the belly button consistent with an umbilical hernia. The patient reports that he has been experiencing a burning pain in the right groin that is often brought on by heavy exertion and radiates from the right groin to the right testicle. The patient reports that he had a prior open right inguinal hernia repair many years ago and the pain only started over the last 4 to 6 months. The patient also noted that he has a bump in the belly button that is not associated with any pain.       Reason For Visit  MARCELLUS LAWSON is a 75 year old male being seen for an initial evaluation.     History of Present Illness  75-year-old male with past medical history of HTN, COPD, degenerative disc disease, lumbar radiculopathy status post spine surgery presenting for evaluation of right groin and right thigh pain and swelling. Patient states his symptoms have resolved prior to his appointment. He denies history of prior DVT, PE, SVT. He denies claudication or rest pain. Symptoms have not returned.     urilaogst and was snet for a ct scan and was found to have sludge galballder pt was   MARCELLUS LAWSON  is a 75 y/o male aox3 PMH of prostates cancer HTN, ED, BPH, Celiac disease, COPD Patient c/o right leg edema and testicles groin he was diagnosed with seromaspatient states that on Ct scan of his abdomen he was told he has gallbladder sludge Patient also C/O abdominal bloating and discomfort after eating. Patient was waiting for the seromas and right leg edema to resolve before he had  ready to proceed with cholecystectomy as planned.    urologist and was sent for a ct scan and was found to have sludge gallbladder pt MARCELLUS LAWSON  is a 75 y/o male aox3 PMH of prostates cancer HTN, ED, BPH, Celiac disease, COPD Patient c/o right leg edema and testicles groin he was diagnosed with seromas patient states that on Ct scan of his abdomen he was told he has gallbladder sludge. Patient also C/O abdominal bloating and discomfort after eating. Patient was waiting for the seromas and right leg edema to resolve before he had  ready to proceed with cholecystectomy as planned. Patient pain level ranges from 2/10 to 4/10 with relief from rest .Patient states he was diagnosed with Cdiff he is under  infectious disease DR Izquierdo and is on antibiotics .  urologist and was sent for a ct scan and was found to have sludge gallbladder pt MARCELLUS LAWSON  is a 75 y/o male aox3 PMH of prostates cancer HTN, ED, BPH, Celiac disease, COPD Patient c/o right leg edema and testicles groin he was diagnosed with seromas patient states that on Ct scan of his abdomen he was told he has gallbladder sludge. Patient also C/O abdominal bloating and discomfort after eating. Patient was waiting for the seromas and right leg edema to resolve before he had  ready to proceed with cholecystectomy as planned. Patient pain level range  urologist and was sent for a ct scan and was found to have sludge gallbladder pt MARCELLUS LAWSON  is a 77 y/o male aox3 PMH of prostate cancer HTN, ED on CPAP , BPH, Celiac disease, COPD Patient c/o right leg edema and testicles groin he was diagnosed with seromas patient states that on Ct scan of his abdomen he was told he has gallbladder sludge. Patient also C/O abdominal bloating and discomfort after eating. Patient was waiting for the seromas and right leg edema to resolve before he had  ready to proceed with cholecystectomy as planned. Patient pain level ranges from 3/10 to 5/10 relief from rest and avoiding fatty foods . Patient presents to PST evaluation for a robotic assisted cholecystectomy possible laparoscopic possible open with Dr Shaikh on 8/27/21Patient is going for covid testing on 8/24/

## 2021-08-09 NOTE — H&P PST ADULT - PROBLEM SELECTOR PLAN 4
caprini score is 4 at VTE risk SCD's ordered surgical team to assess the need for pharmacological prophylactics

## 2021-08-09 NOTE — H&P PST ADULT - PROBLEM SELECTOR PLAN 5
patient was educated on  testing as well as prevention of covid patient is going for covid testing aon 8/214/21

## 2021-08-09 NOTE — H&P PST ADULT - ASSESSMENT
OPIOID RISK TOOL    CHARLES EACH BOX THAT APPLIES AND ADD TOTALS AT THE END    FAMILY HISTORY OF SUBSTANCE ABUSE                 FEMALE         MALE                                                Alcohol                             [  ]1 pt          [  ]3pts                                               Illegal Drugs                     [  ]2 pts        [  ]3pts                                               Rx Drugs                           [  ]4 pts        [  ]4 pts    PERSONAL HISTORY OF SUBSTANCE ABUSE                                                                                          Alcohol                             [  ]3 pts       [  ]3 pts                                               Illegal Drugs                     [  ]4 pts        [  ]4 pts                                               Rx Drugs                           [  ]5 pts        [  ]5 pts    AGE BETWEEN 16-45 YEARS                                      [  ]1 pt         [  ]1 pt    HISTORY OF PREADOLESCENT   SEXUAL ABUSE                                                             [  ]3 pts        [  ]0pts    PSYCHOLOGICAL DISEASE                     ADD, OCD, Bipolar, Schizophrenia        [  ]2 pts         [  ]2 pts                      Depression                                               [  ]1 pt           [  ]1 pt           SCORING TOTAL   (add numbers and type here)              (*0**)                                       BÁRBARAI VTE 2.0 SCORE [CLOT updated 2019]    AGE RELATED RISK FACTORS                                                       MOBILITY RELATED FACTORS  [ ] Age 41-60 years                                            (1 Point)                    [ ] Bed rest                                                        (1 Point)  [ ] Age: 61-74 years                                           (2 Points)                  [ ] Plaster cast                                                   (2 Points)  [ ] Age= 75 years                                              (3 Points)                    [ ] Bed bound for more than 72 hours                 (2 Points)    DISEASE RELATED RISK FACTORS                                               GENDER SPECIFIC FACTORS  [ ] Edema in the lower extremities                       (1 Point)              [ ] Pregnancy                                                     (1 Point)  [ ] Varicose veins                                               (1 Point)                     [ ] Post-partum < 6 weeks                                   (1 Point)             [ ] BMI > 25 Kg/m2                                            (1 Point)                     [ ] Hormonal therapy  or oral contraception          (1 Point)                 [ ] Sepsis (in the previous month)                        (1 Point)               [ ] History of pregnancy complications                 (1 point)  [ ] Pneumonia or serious lung disease                                               [ ] Unexplained or recurrent                     (1 Point)           (in the previous month)                               (1 Point)  [ ] Abnormal pulmonary function test                     (1 Point)                 SURGERY RELATED RISK FACTORS  [ ] Acute myocardial infarction                              (1 Point)               [ ]  Section                                             (1 Point)  [ ] Congestive heart failure (in the previous month)  (1 Point)      [ ] Minor surgery                                                  (1 Point)   [ ] Inflammatory bowel disease                             (1 Point)               [ ] Arthroscopic surgery                                        (2 Points)  [ ] Central venous access                                      (2 Points)                [ ] General surgery lasting more than 45 minutes (2 points)  [ ] Malignancy- Present or previous                   (2 Points)                [ ] Elective arthroplasty                                         (5 points)    [ ] Stroke (in the previous month)                          (5 Points)                                                                                                                                                           HEMATOLOGY RELATED FACTORS                                                 TRAUMA RELATED RISK FACTORS  [ ] Prior episodes of VTE                                     (3 Points)                [ ] Fracture of the hip, pelvis, or leg                       (5 Points)  [ ] Positive family history for VTE                         (3 Points)             [ ] Acute spinal cord injury (in the previous month)  (5 Points)  [ ] Prothrombin 36617 A                                     (3 Points)               [ ] Paralysis  (less than 1 month)                             (5 Points)  [ ] Factor V Leiden                                             (3 Points)                  [ ] Multiple Trauma within 1 month                        (5 Points)  [ ] Lupus anticoagulants                                     (3 Points)                                                           [ ] Anticardiolipin antibodies                               (3 Points)                                                       [ ] High homocysteine in the blood                      (3 Points)                                             [ ] Other congenital or acquired thrombophilia      (3 Points)                                                [ ] Heparin induced thrombocytopenia                  (3 Points)                                     Total Score [          ] OPIOID RISK TOOL    CHARLES EACH BOX THAT APPLIES AND ADD TOTALS AT THE END    FAMILY HISTORY OF SUBSTANCE ABUSE                 FEMALE         MALE                                                Alcohol                             [  ]1 pt          [  ]3pts                                               Illegal Drugs                     [  ]2 pts        [  ]3pts                                               Rx Drugs                           [  ]4 pts        [  ]4 pts    PERSONAL HISTORY OF SUBSTANCE ABUSE                                                                                          Alcohol                             [  ]3 pts       [  ]3 pts                                               Illegal Drugs                     [  ]4 pts        [  ]4 pts                                               Rx Drugs                           [  ]5 pts        [  ]5 pts    AGE BETWEEN 16-45 YEARS                                      [  ]1 pt         [  ]1 pt    HISTORY OF PREADOLESCENT   SEXUAL ABUSE                                                             [  ]3 pts        [  ]0pts    PSYCHOLOGICAL DISEASE                     ADD, OCD, Bipolar, Schizophrenia        [  ]2 pts         [  ]2 pts                      Depression                                               [  ]1 pt           [  ]1 pt           SCORING TOTAL   (add numbers and type here)              (*0**)                                       CAPRINI VTE 2.0 SCORE [CLOT updated 2019]    AGE RELATED RISK FACTORS                                                       MOBILITY RELATED FACTORS  [ ] Age 41-60 years                                            (1 Point)                    [ ] Bed rest                                                        (1 Point)  [x ] Age: 61-74 years                                           (2 Points)                  [ ] Plaster cast                                                   (2 Points)  [ ] Age= 75 years                                              (3 Points)                    [ ] Bed bound for more than 72 hours                 (2 Points)    DISEASE RELATED RISK FACTORS                                               GENDER SPECIFIC FACTORS  [ ] Edema in the lower extremities                       (1 Point)              [ ] Pregnancy                                                     (1 Point)  [ ] Varicose veins                                               (1 Point)                     [ ] Post-partum < 6 weeks                                   (1 Point)             [ ] BMI > 25 Kg/m2                                            (1 Point)                     [ ] Hormonal therapy  or oral contraception          (1 Point)                 [ ] Sepsis (in the previous month)                        (1 Point)               [ ] History of pregnancy complications                 (1 point)  [ ] Pneumonia or serious lung disease                                               [ ] Unexplained or recurrent                     (1 Point)           (in the previous month)                               (1 Point)  [ ] Abnormal pulmonary function test                     (1 Point)                 SURGERY RELATED RISK FACTORS  [ ] Acute myocardial infarction                              (1 Point)               [ ]  Section                                             (1 Point)  [ ] Congestive heart failure (in the previous month)  (1 Point)      [ ] Minor surgery                                                  (1 Point)   [ ] Inflammatory bowel disease                             (1 Point)               [ ] Arthroscopic surgery                                        (2 Points)  [ ] Central venous access                                      (2 Points)                [x ] General surgery lasting more than 45 minutes (2 points)  [ ] Malignancy- Present or previous                   (2 Points)                [ ] Elective arthroplasty                                         (5 points)    [ ] Stroke (in the previous month)                          (5 Points)                                                                                                                                                           HEMATOLOGY RELATED FACTORS                                                 TRAUMA RELATED RISK FACTORS  [ ] Prior episodes of VTE                                     (3 Points)                [ ] Fracture of the hip, pelvis, or leg                       (5 Points)  [ ] Positive family history for VTE                         (3 Points)             [ ] Acute spinal cord injury (in the previous month)  (5 Points)  [ ] Prothrombin 65568 A                                     (3 Points)               [ ] Paralysis  (less than 1 month)                             (5 Points)  [ ] Factor V Leiden                                             (3 Points)                  [ ] Multiple Trauma within 1 month                        (5 Points)  [ ] Lupus anticoagulants                                     (3 Points)                                                           [ ] Anticardiolipin antibodies                               (3 Points)                                                       [ ] High homocysteine in the blood                      (3 Points)                                             [ ] Other congenital or acquired thrombophilia      (3 Points)                                                [ ] Heparin induced thrombocytopenia                  (3 Points)                                     Total Score [      4    ]      MARCELLUS LAWSON  is a 77 y/o male aox3 PMH of prostate cancer HTN, ED on CPAP , BPH, Celiac disease, COPD Patient c/o right leg edema and testicles groin he was diagnosed with seromas patient states that on Ct scan of his abdomen he was told he has gallbladder sludge. Patient also C/O abdominal bloating and discomfort after eating. Patient was waiting for the seromas and right leg edema to resolve before he had  ready to proceed with cholecystectomy as planned. Patient pain level ranges from 3/10 to 5/10 relief from rest and avoiding fatty foods . Patient presents to PST evaluation for a robotic assisted cholecystectomy possible laparoscopic possible open with Dr Shaikh on 21Patient is going for covid testing on /    Patient was educated on preoperative preparation with written and verbal instruction . Patient is going for medical clearance with DR Serrano [phone]  . Patient will review medications with PCP. Patient was educated on aspirin and aspirin products NSAIDs ,vitamins and herbals that increase the risk of bleeding and need to be stopped five days before procedure  . Patient was also educated on covid testing and covid prevention ,social distancing and wearing a mask.

## 2021-08-09 NOTE — H&P PST ADULT - PROBLEM SELECTOR PLAN 2
Patient is having a robotic assisted cholecystectomy possible laparoscopic possible open with Dr Shaikh on 8/27/2

## 2021-08-09 NOTE — H&P PST ADULT - NEGATIVE MALE-SPECIFIC SYMPTOMS
no urethral discharge/no genital sores/no impotence/no ejaculatory dysfunction/no erectile dysfunction/no scrotal mass L/no scrotal mass R/no undescended testicle L/no undescended testicle R/not applicable

## 2021-08-09 NOTE — H&P PST ADULT - NSICDXFAMILYHX_GEN_ALL_CORE_FT
FAMILY HISTORY:  Father  Still living? No  Family history of hypertension in father, Age at diagnosis: Age Unknown  Family history of prostate cancer in father, Age at diagnosis: Age Unknown

## 2021-08-09 NOTE — H&P PST ADULT - PROBLEM SELECTOR PLAN 1
Patient is having a robotic assisted cholecystectomy possible laparoscopic possible open with Dr Shiakh on 8/27/21

## 2021-08-09 NOTE — H&P PST ADULT - NSICDXPASTSURGICALHX_GEN_ALL_CORE_FT
PAST SURGICAL HISTORY:  Cataract extraction status h/o bilateral cataract extraction    H/O arthroscopy of left knee     History of inguinal herniorrhaphy right side    History of Tonsillectomy childhood    ORIF Right Radius 1974    S/P cervical discectomy and fusion C3-C7 9/23/2011

## 2021-08-09 NOTE — H&P PST ADULT - OTHER CARE PROVIDERS
Dr [phone] pt is going fro medical clearance with Dr Serrano /DR Garcia [phone], cardiology DR Campos [phone] , infectious disease DR Izquierdo [phone] pt is going fro medical clearance with Dr Serrano [phone] pt is going for medical clearance with Dr Serrano [phone] pt is going for medical clearance with Dr Serrano [phone] cardiology Dr Poon

## 2021-08-09 NOTE — H&P PST ADULT - EKG AND INTERPRETATION
EKG reviewed personally Rate =       Bpm   finding    official reading EKG reviewed personally Rate = 65      Bpm   finding  normal sinus rhythm pending   official reading

## 2021-08-09 NOTE — H&P PST ADULT - NSICDXPASTMEDICALHX_GEN_ALL_CORE_FT
PAST MEDICAL HISTORY:  Acid reflux disease     BPH (Benign Prostatic Hypertrophy)     CD (celiac disease)     H/O thyroid nodule     Hypertension Dx: 2006    Inguinal hernia     Lumbar radiculopathy     Lung nodule seen on imaging study been monitored for 5 years without any change in size as per patient    ED (Obstructive Sleep Apnea) Dx: 2001, uses CPAP nightly.    Prostate cancer     Umbilical hernia without obstruction or gangrene

## 2021-08-09 NOTE — H&P PST ADULT - NEGATIVE OPHTHALMOLOGIC SYMPTOMS
no diplopia/no photophobia/no lacrimation L/no lacrimation R/no blurred vision L/no blurred vision R/no discharge L/no discharge R/no pain L

## 2021-08-09 NOTE — H&P PST ADULT - NSICDXPROBLEM_GEN_ALL_CORE_FT
PROBLEM DIAGNOSES  Problem: Need for prophylactic measure  Assessment and Plan: caprini score is SVDs ordered surgical team to assess the need for pharm proph     Problem: Educated about COVID-19 virus infection  Assessment and Plan: educated on testing as well as prevention of covid .

## 2021-08-09 NOTE — H&P PST ADULT - ATTENDING COMMENTS
Agree with above assessment.  The risks, benefits, and alternatives including the option of doing nothing to a robotic, possible laparoscopic, and possible open cholecystectomy were discussed.  The potential complications including but not limited to infection, bleeding, bowel injury and/or obstruction, bile leak, and bile duct injury were discussed.  The patient admitted understanding and agrees to proceed as planned.

## 2021-08-10 DIAGNOSIS — K82.8 OTHER SPECIFIED DISEASES OF GALLBLADDER: ICD-10-CM

## 2021-08-10 DIAGNOSIS — R10.9 UNSPECIFIED ABDOMINAL PAIN: ICD-10-CM

## 2021-08-10 DIAGNOSIS — R14.0 ABDOMINAL DISTENSION (GASEOUS): ICD-10-CM

## 2021-08-19 ENCOUNTER — APPOINTMENT (OUTPATIENT)
Dept: PULMONOLOGY | Facility: CLINIC | Age: 76
End: 2021-08-19
Payer: MEDICARE

## 2021-08-19 VITALS
OXYGEN SATURATION: 98 % | HEART RATE: 66 BPM | BODY MASS INDEX: 22.53 KG/M2 | SYSTOLIC BLOOD PRESSURE: 122 MMHG | WEIGHT: 157 LBS | DIASTOLIC BLOOD PRESSURE: 70 MMHG | RESPIRATION RATE: 14 BRPM

## 2021-08-19 DIAGNOSIS — J44.9 CHRONIC OBSTRUCTIVE PULMONARY DISEASE, UNSPECIFIED: ICD-10-CM

## 2021-08-19 PROCEDURE — 99214 OFFICE O/P EST MOD 30 MIN: CPT

## 2021-08-19 RX ORDER — ENALAPRIL MALEATE 10 MG/1
10 TABLET ORAL
Refills: 0 | Status: DISCONTINUED | COMMUNITY
End: 2021-08-19

## 2021-08-19 NOTE — CONSULT LETTER
[Dear  ___] : Dear  [unfilled], [Consult Letter:] : I had the pleasure of evaluating your patient, [unfilled]. [Please see my note below.] : Please see my note below. [Sincerely,] : Sincerely, [FreeTextEntry3] : Crow Wagner DO [facility]\par Pulmonary Critical Care\par Director Pulmonary Division\par Medical Director Respiratory Therapy\par [facility]\par \par

## 2021-08-19 NOTE — DISCUSSION/SUMMARY
[FreeTextEntry1] : asthma mild intermittent, prn rescue, stable minute nodules by hx, last CXR 5/20 neg\par post total prostatectomy, complicated by seroma with intermittent drainage\par never smoker, no pulmonary complaints, climbs stairs without any dyspnea\par lung exam is normal, normal sp02\par Saw cardiology , has clearance per pt, needs follow up echo\par has ED, compliant with CPAP, ( 8cm water per pt ) \par PFts 1/21 very mild air flow obstruction, normal TLC and DLCO\par Fully vaccinated Pfizer, discussed booster \par add inhaled steroids preoperatively, prn rescue, technique reviewed\par CXR\par follow up 6 months or sooner if needed, will call with CXR\par No pulmonary contraindications to proposed procedure\par Mild increased risk of postoperative pulmonary complications\par Currently at baseline as above\par albuterol neb q 6 hrs, budesonide neb 0.5 mg bid\par incentive spirometry and DVT prophylaxis\par Pt will bring his own cpap ( 8 cm h20 by hx) to use post operatively and qHS\par \par

## 2021-08-19 NOTE — HISTORY OF PRESENT ILLNESS
[TextBox_4] : never smoker\par doing well, no fever, chill, chest pain\par no dyspnea\par for cholecystectomy next week\par has ED on cpap, now mild- cpap 8\par Cardiology Dr Poon [facility]\par  saw vascular, no DVT

## 2021-08-20 RX ORDER — CEFAZOLIN SODIUM 1 G
2000 VIAL (EA) INJECTION ONCE
Refills: 0 | Status: DISCONTINUED | OUTPATIENT
Start: 2021-08-27 | End: 2021-09-10

## 2021-08-20 NOTE — ED PROVIDER NOTE - NS ED ATTENDING STATEMENT MOD
EMERGENCY DEPARTMENT HISTORY AND PHYSICAL EXAM      Date: 8/20/2021  Patient Name: Kari Fitzgearld    History of Presenting Illness     Chief Complaint   Patient presents with    Abdominal Pain     pt ambulatory to triage and reports that for the the past two days she has been having abdominal pain, NVD, and CP. pt reports that she has been unable to keep anything down. pt also reports that this morning she passed out and fell onto her left side and is having left sided pain all the way down her body.  Vomiting    Diarrhea    Chest Pain       History Provided By: Patient    HPI: Kari Fitzgerald, 46 y.o. female presents to the ED with cc of abdominal pain vomiting and diarrhea. Patient symptoms started yesterday morning. She states that she has had less than 20 episodes of vomiting, more than 20 episodes of diarrhea. Her abdominal pain is constant and is located in the lower quadrants. She has not been able to keep anything down since yesterday. She also had a syncopal episode this morning and injured her left ankle, left knee, left hip and left elbow. Her worst pain involves the left knee and left ankle. She states those are 6 out of 10 in severity. Abdominal pain was more severe previously, but is now 4 out of 10. She did not take anything for pain prior to arrival.  She also states that she has intermittent chest pain which is located in the sternal region. She says she has a history of costochondritis, in addition  She has Lott's esophagus. She denies shortness of breath, cough, fever or chills. She also denies dysuria. She has not been on antibiotics in the last month. She is not on any blood thinners. She thinks that she did hit her head, but she has minimal pain involving the left forehead. There are no other complaints, changes, or physical findings at this time. PCP: Macey Navarro MD    No current facility-administered medications on file prior to encounter.      Current Outpatient Medications on File Prior to Encounter   Medication Sig Dispense Refill    methocarbamol (ROBAXIN) 750 mg tablet Take 1 Tab by mouth four (4) times daily as needed for Pain. 20 Tab 0    traMADol (ULTRAM) 50 mg tablet Take 50 mg by mouth every six (6) hours as needed for Pain.  Diclofenac Sodium 1.5 % drop by Apply Externally route.  omeprazole-sodium bicarbonate (ZEGERID) 40-1.1 mg-gram capsule Take 1 Cap by mouth daily.  sucralfate (CARAFATE) 1 gram tablet Take 1 g by mouth four (4) times daily.  metFORMIN (GLUMETZA) 500 mg TG24 24 hour tablet Take  by mouth.  montelukast (SINGULAIR) 10 mg tablet Take 10 mg by mouth daily.  Omega-3-DHA-EPA-Fish Oil 1,000 (120-180) mg cap Take  by mouth.  cholecalciferol, vitamin D3, (VITAMIN D3) 2,000 unit tab Take 4,000 Units by mouth daily.  co-enzyme Q-10 (CO Q-10) 100 mg capsule Take 100 mg by mouth daily.  ibuprofen (ADVIL) 200 mg tablet Take 400 mg by mouth every six (6) hours as needed for Pain.  LOSARTAN PO Take 50 mg by mouth.  PRAVASTATIN SODIUM (PRAVASTATIN PO) Take 80 mg by mouth.  NORETHINDRONE-MESTRANOL (NECON 1/50, 28, PO) Take  by mouth.  levothyroxine (SYNTHROID) 125 mcg tablet Take 125 mcg by mouth Daily (before breakfast).  Milnacipran (SAVELLA) 25 mg tablet Take 50 mg by mouth two (2) times a day.  tiZANidine (ZANAFLEX) 4 mg capsule Take 4 mg by mouth three (3) times daily. 1/2-1 tablet 3 times daily      baclofen (LIORESAL) 10 mg tablet Take 10 mg by mouth three (3) times daily.  traZODone (DESYREL) 50 mg tablet Take 150 mg by mouth nightly.  Zolpidem 10 mg Subl Take 12.5 mg by mouth nightly.  eletriptan (RELPAX) 40 mg tablet Take 40 mg by mouth once as needed. may repeat in 2 hours if necessary      mometasone (NASONEX) 50 mcg/actuation nasal spray 2 Sprays daily.  ALBUTEROL IN Take  by inhalation.       multivitamin (ONE A DAY) tablet Take 1 Tab by mouth daily. Past History     Past Medical History:  Past Medical History:   Diagnosis Date    Asthma     [facility] [address]     Chronic back pain     Diabetes [facility] [address]     Fibromyalgia     GERD (gastroesophageal reflux disease)     H/O peptic ulcer     Hyperlipidemia     Hypertension     Hypothyroidism     Insomnia     Migraines     PTSD (post-traumatic stress disorder)        Past Surgical History:  Past Surgical History:   Procedure Laterality Date    HX LAP CHOLECYSTECTOMY      HX THYROIDECTOMY         Family History:  Family History   Problem Relation Age of Onset    Heart Disease Mother     Heart Disease Father     Heart Attack Father     Heart Disease Brother     Heart Disease Maternal Grandmother     Heart Disease Paternal Grandmother     Heart Attack Brother        Social History:  Social History     Tobacco Use    Smoking status: Never Smoker    Smokeless tobacco: Former User   Substance Use Topics    Alcohol use: No    Drug use: No       Allergies: Allergies   Allergen Reactions    Sulfa (Sulfonamide Antibiotics) Anaphylaxis    Aspirin Nausea and Vomiting    Codeine Nausea and Vomiting    Levaquin [Levofloxacin] Swelling    Metformin Nausea and Vomiting         Review of Systems   Review of Systems   Constitutional: Negative for fever. HENT: Negative for congestion. Eyes: Negative. Respiratory: Negative for shortness of breath. Cardiovascular: Positive for chest pain. Gastrointestinal: Positive for abdominal pain. Endocrine: Negative for heat intolerance. Genitourinary: Negative for flank pain. Musculoskeletal: Negative for back pain. Skin: Negative for rash. Allergic/Immunologic: Negative for immunocompromised state. Neurological: Positive for syncope. Hematological: Does not bruise/bleed easily. Psychiatric/Behavioral: Negative. All other systems reviewed and are negative.       Physical Exam   Physical Exam  Vitals and nursing note reviewed. Constitutional:       General: She is not in acute distress. Appearance: She is well-developed. HENT:      Head: Normocephalic. Cardiovascular:      Rate and Rhythm: Normal rate and regular rhythm. Heart sounds: Normal heart sounds. Pulmonary:      Effort: Pulmonary effort is normal.      Breath sounds: Normal breath sounds. Abdominal:      General: Bowel sounds are normal.      Palpations: Abdomen is soft. Tenderness: There is abdominal tenderness in the right lower quadrant and left lower quadrant. Musculoskeletal:         General: Normal range of motion. Cervical back: Normal range of motion and neck supple. Skin:     General: Skin is warm and dry. Comments: Mild bruising noted involving the left foot, patient also has left ankle, left knee and left hip tenderness as well as left elbow tenderness. She has good range of motion however. Neurological:      General: No focal deficit present. Mental Status: She is alert and oriented to person, place, and time.    Psychiatric:         Mood and Affect: Mood normal.         Behavior: Behavior normal.         Diagnostic Study Results     Labs -     Recent Results (from the past 12 hour(s))   GLUCOSE, POC    Collection Time: 08/20/21  9:59 AM   Result Value Ref Range    Glucose (POC) 123 (H) 65 - 117 mg/dL    Performed by Corby Rodríguez    CBC W/O DIFF    Collection Time: 08/20/21 10:31 AM   Result Value Ref Range    WBC 11.5 (H) 3.6 - 11.0 K/uL    RBC 4.17 3.80 - 5.20 M/uL    HGB 12.7 11.5 - 16.0 g/dL    HCT 39.1 35.0 - 47.0 %    MCV 93.8 80.0 - 99.0 FL    MCH 30.5 26.0 - 34.0 PG    MCHC 32.5 30.0 - 36.5 g/dL    RDW 12.4 11.5 - 14.5 %    PLATELET 237 927 - 416 K/uL    MPV 9.2 8.9 - 12.9 FL    NRBC 0.0 0  WBC    ABSOLUTE NRBC 0.00 0.00 - 6.22 K/uL   METABOLIC PANEL, COMPREHENSIVE    Collection Time: 08/20/21 10:31 AM   Result Value Ref Range    Sodium 136 136 - 145 mmol/L    Potassium 3.6 3.5 - 5.1 mmol/L Chloride 104 97 - 108 mmol/L    CO2 25 21 - 32 mmol/L    Anion gap 7 5 - 15 mmol/L    Glucose 129 (H) 65 - 100 mg/dL    BUN 10 6 - 20 MG/DL    Creatinine 0.93 0.55 - 1.02 MG/DL    BUN/Creatinine ratio 11 (L) 12 - 20      GFR est AA >60 >60 ml/min/1.73m2    GFR est non-AA >60 >60 ml/min/1.73m2    Calcium 8.7 8.5 - 10.1 MG/DL    Bilirubin, total 0.3 0.2 - 1.0 MG/DL    ALT (SGPT) 20 12 - 78 U/L    AST (SGOT) 15 15 - 37 U/L    Alk. phosphatase 47 45 - 117 U/L    Protein, total 7.7 6.4 - 8.2 g/dL    Albumin 3.6 3.5 - 5.0 g/dL    Globulin 4.1 (H) 2.0 - 4.0 g/dL    A-G Ratio 0.9 (L) 1.1 - 2.2     LIPASE    Collection Time: 08/20/21 10:31 AM   Result Value Ref Range    Lipase 111 73 - 393 U/L   TROPONIN I    Collection Time: 08/20/21 10:31 AM   Result Value Ref Range    Troponin-I, Qt. <0.05 <0.05 ng/mL   URINALYSIS W/ RFLX MICROSCOPIC    Collection Time: 08/20/21 11:27 AM   Result Value Ref Range    Color DARK YELLOW      Appearance CLEAR CLEAR      Specific gravity 1.025 1.003 - 1.030      pH (UA) 5.5 5.0 - 8.0      Protein 30 (A) NEG mg/dL    Glucose Negative NEG mg/dL    Ketone TRACE (A) NEG mg/dL    Blood Negative NEG      Urobilinogen 0.2 0.2 - 1.0 EU/dL    Nitrites Negative NEG      Leukocyte Esterase Negative NEG      WBC 0-4 0 - 4 /hpf    RBC 0-5 0 - 5 /hpf    Epithelial cells FEW FEW /lpf    Bacteria Negative NEG /hpf    Mucus 2+ (A) NEG /lpf    Hyaline cast 5-10 0 - 5 /lpf   SAMPLES BEING HELD    Collection Time: 08/20/21 11:27 AM   Result Value Ref Range    SAMPLES BEING HELD UC     COMMENT        Add-on orders for these samples will be processed based on acceptable specimen integrity and analyte stability, which may vary by analyte. BILIRUBIN, CONFIRM    Collection Time: 08/20/21 11:27 AM   Result Value Ref Range    Bilirubin UA, confirm Negative NEG         Radiologic Studies -   XR KNEE LT MAX 2 VWS   Final Result   No acute abnormality.       XR HIP LT W OR WO PELV 2-3 VWS   Final Result   No acute abnormality. XR FOOT LT AP/LAT   Final Result   No acute abnormality. XR ELBOW LT AP/LAT   Final Result   No acute abnormality. XR CHEST PORT   Final Result   No acute cardiopulmonary process. XR ANKLE LT AP/LAT   Final Result   No acute abnormality. CT ABD PELV W CONT   Final Result   1. Left-sided nephrolithiasis. 2. Diverticulosis without evidence of diverticulitis. 3. Incidental findings as above. CT Results  (Last 48 hours)    None        CXR Results  (Last 48 hours)    None          Medical Decision Making   I am the first provider for this patient. I reviewed the vital signs, available nursing notes, past medical history, past surgical history, family history and social history. Vital Signs-Reviewed the patient's vital signs. Patient Vitals for the past 12 hrs:   Temp Pulse Resp BP SpO2   08/20/21 1115 -- -- -- 111/83 93 %   08/20/21 0958 98 °F (36.7 °C) (!) 107 20 119/74 99 %           Records Reviewed: Nursing Notes and Old Medical Records    Provider Notes (Medical Decision Making):   Appendicitis, diverticulitis, UTI, ovarian cyst, gastroenteritis, colitis, dehydration, electrolyte abnormality, fracture, sprain, contusion    ED Course:   Initial assessment performed. The patients presenting problems have been discussed, and they are in agreement with the care plan formulated and outlined with them. I have encouraged them to ask questions as they arise throughout their visit. Progress note: The patient is feeling better. Her results were reviewed. She is advised to follow-up and return to ER if worse           Critical Care Time:   0    Disposition:  home    DISCHARGE PLAN:  1.    Discharge Medication List as of 8/20/2021  2:17 PM      START taking these medications    Details   prochlorperazine (Compazine) 10 mg tablet Take 1 Tablet by mouth every eight (8) hours as needed for Nausea., Normal, Disp-12 Tablet, R-0      dicyclomine (BENTYL) 20 mg tablet Take 1 Tablet by mouth every six (6) hours as needed for Abdominal Cramps., Normal, Disp-20 Tablet, R-0      methocarbamoL (ROBAXIN) 750 mg tablet Take 1 Tablet by mouth four (4) times daily as needed for Muscle Spasm(s). , Normal, Disp-20 Tablet, R-0         CONTINUE these medications which have NOT CHANGED    Details   traMADol (ULTRAM) 50 mg tablet Take 50 mg by mouth every six (6) hours as needed for Pain., Historical Med      Diclofenac Sodium 1.5 % drop by Apply Externally route., Historical Med      omeprazole-sodium bicarbonate (ZEGERID) 40-1.1 mg-gram capsule Take 1 Cap by mouth daily. , Historical Med      sucralfate (CARAFATE) 1 gram tablet Take 1 g by mouth four (4) times daily. , Historical Med      metFORMIN (GLUMETZA) 500 mg TG24 24 hour tablet Take  by mouth., Historical Med      montelukast (SINGULAIR) 10 mg tablet Take 10 mg by mouth daily. , Historical Med      Omega-3-DHA-EPA-Fish Oil 1,000 (120-180) mg cap Take  by mouth., Historical Med      cholecalciferol, vitamin D3, (VITAMIN D3) 2,000 unit tab Take 4,000 Units by mouth daily. , Historical Med      co-enzyme Q-10 (CO Q-10) 100 mg capsule Take 100 mg by mouth daily. , Historical Med      ibuprofen (ADVIL) 200 mg tablet Take 400 mg by mouth every six (6) hours as needed for Pain., Historical Med      LOSARTAN PO Take 50 mg by mouth., Historical Med      PRAVASTATIN SODIUM (PRAVASTATIN PO) Take 80 mg by mouth., Historical Med      NORETHINDRONE-MESTRANOL (NECON 1/50, 28, PO) Take  by mouth., Historical Med      levothyroxine (SYNTHROID) 125 mcg tablet Take 125 mcg by mouth Daily (before breakfast). , Historical Med      Milnacipran (SAVELLA) 25 mg tablet Take 50 mg by mouth two (2) times a day., Historical Med      tiZANidine (ZANAFLEX) 4 mg capsule Take 4 mg by mouth three (3) times daily. 1/2-1 tablet 3 times daily, Historical Med      baclofen (LIORESAL) 10 mg tablet Take 10 mg by mouth three (3) times daily. , Historical Med traZODone (DESYREL) 50 mg tablet Take 150 mg by mouth nightly., Historical Med      Zolpidem 10 mg Subl Take 12.5 mg by mouth nightly., Historical Med      eletriptan (RELPAX) 40 mg tablet Take 40 mg by mouth once as needed. may repeat in 2 hours if necessary, Historical Med      mometasone (NASONEX) 50 mcg/actuation nasal spray 2 Sprays daily. , Historical Med      ALBUTEROL IN Take  by inhalation. , Historical Med      multivitamin (ONE A DAY) tablet Take 1 Tab by mouth daily. , Historical Med           2. Follow-up Information     Follow up With Specialties Details Why Contact Info    Flaquito Davis MD Family Medicine  As needed [id number] [place] [address]  [address] [address] [phone]          Follow-up with your gastroenterologist as scheduled    [facility] EMERGENCY DEPT Emergency Medicine  If symptoms worsen [address]  [address]   [address] [address] [phone]        3. Return to ED if worse     Diagnosis     Clinical Impression:   1. Right ovarian cyst    2. Diverticulosis    3. Elbow sprain, left, initial encounter    4. Sprain of left knee, unspecified ligament, initial encounter    5. Diarrhea, unspecified type        Attestations:    Steve Hilton MD    Please note that this dictation was completed with [employer]jar, the computer voice recognition software. Quite often unanticipated grammatical, syntax, homophones, and other interpretive errors are inadvertently transcribed by the computer software. Please disregard these errors. Please excuse any errors that have escaped final proofreading. Thank you. Attending Only

## 2021-08-24 ENCOUNTER — APPOINTMENT (OUTPATIENT)
Dept: DISASTER EMERGENCY | Facility: CLINIC | Age: 76
End: 2021-08-24

## 2021-08-25 LAB — SARS-COV-2 N GENE NPH QL NAA+PROBE: NOT DETECTED

## 2021-08-26 ENCOUNTER — TRANSCRIPTION ENCOUNTER (OUTPATIENT)
Age: 76
End: 2021-08-26

## 2021-08-27 ENCOUNTER — APPOINTMENT (OUTPATIENT)
Dept: SURGERY | Facility: HOSPITAL | Age: 76
End: 2021-08-27

## 2021-08-27 ENCOUNTER — OUTPATIENT (OUTPATIENT)
Dept: INPATIENT UNIT | Facility: HOSPITAL | Age: 76
LOS: 1 days | Discharge: HOME | End: 2021-08-27
Payer: COMMERCIAL

## 2021-08-27 ENCOUNTER — RESULT REVIEW (OUTPATIENT)
Age: 76
End: 2021-08-27

## 2021-08-27 VITALS
HEART RATE: 65 BPM | DIASTOLIC BLOOD PRESSURE: 73 MMHG | WEIGHT: 156.53 LBS | TEMPERATURE: 97 F | RESPIRATION RATE: 16 BRPM | SYSTOLIC BLOOD PRESSURE: 148 MMHG | HEIGHT: 70 IN | OXYGEN SATURATION: 100 %

## 2021-08-27 VITALS
RESPIRATION RATE: 14 BRPM | OXYGEN SATURATION: 98 % | DIASTOLIC BLOOD PRESSURE: 54 MMHG | HEART RATE: 72 BPM | SYSTOLIC BLOOD PRESSURE: 103 MMHG

## 2021-08-27 DIAGNOSIS — Z98.49 CATARACT EXTRACTION STATUS, UNSPECIFIED EYE: Chronic | ICD-10-CM

## 2021-08-27 DIAGNOSIS — R14.0 ABDOMINAL DISTENSION (GASEOUS): ICD-10-CM

## 2021-08-27 DIAGNOSIS — K82.8 OTHER SPECIFIED DISEASES OF GALLBLADDER: ICD-10-CM

## 2021-08-27 DIAGNOSIS — R10.9 UNSPECIFIED ABDOMINAL PAIN: ICD-10-CM

## 2021-08-27 DIAGNOSIS — Z98.890 OTHER SPECIFIED POSTPROCEDURAL STATES: Chronic | ICD-10-CM

## 2021-08-27 DIAGNOSIS — Z98.89 OTHER SPECIFIED POSTPROCEDURAL STATES: Chronic | ICD-10-CM

## 2021-08-27 PROCEDURE — C1889: CPT

## 2021-08-27 PROCEDURE — 88304 TISSUE EXAM BY PATHOLOGIST: CPT | Mod: 26

## 2021-08-27 PROCEDURE — 88304 TISSUE EXAM BY PATHOLOGIST: CPT

## 2021-08-27 PROCEDURE — 36415 COLL VENOUS BLD VENIPUNCTURE: CPT

## 2021-08-27 PROCEDURE — S2900 ROBOTIC SURGICAL SYSTEM: CPT | Mod: NC

## 2021-08-27 PROCEDURE — 47562 LAPAROSCOPIC CHOLECYSTECTOMY: CPT

## 2021-08-27 PROCEDURE — S2900: CPT

## 2021-08-27 PROCEDURE — 47562 LAPAROSCOPIC CHOLECYSTECTOMY: CPT | Mod: AS

## 2021-08-27 RX ORDER — ONDANSETRON 8 MG/1
4 TABLET, FILM COATED ORAL ONCE
Refills: 0 | Status: DISCONTINUED | OUTPATIENT
Start: 2021-08-27 | End: 2021-08-27

## 2021-08-27 RX ORDER — INDOCYANINE GREEN 25 MG
25 KIT INTRAVASCULAR; INTRAVENOUS ONCE
Refills: 0 | Status: COMPLETED | OUTPATIENT
Start: 2021-08-27 | End: 2021-08-27

## 2021-08-27 RX ORDER — BUPIVACAINE 13.3 MG/ML
20 INJECTION, SUSPENSION, LIPOSOMAL INFILTRATION ONCE
Refills: 0 | Status: DISCONTINUED | OUTPATIENT
Start: 2021-08-27 | End: 2021-08-27

## 2021-08-27 RX ORDER — FENTANYL CITRATE 50 UG/ML
25 INJECTION INTRAVENOUS
Refills: 0 | Status: DISCONTINUED | OUTPATIENT
Start: 2021-08-27 | End: 2021-08-27

## 2021-08-27 RX ORDER — HYDROMORPHONE HYDROCHLORIDE 2 MG/ML
0.5 INJECTION INTRAMUSCULAR; INTRAVENOUS; SUBCUTANEOUS
Refills: 0 | Status: DISCONTINUED | OUTPATIENT
Start: 2021-08-27 | End: 2021-08-27

## 2021-08-27 RX ORDER — SODIUM CHLORIDE 9 MG/ML
1000 INJECTION, SOLUTION INTRAVENOUS
Refills: 0 | Status: DISCONTINUED | OUTPATIENT
Start: 2021-08-27 | End: 2021-08-27

## 2021-08-27 RX ADMIN — INDOCYANINE GREEN 25 MILLIGRAM(S): KIT INTRAVASCULAR; INTRAVENOUS at 09:35

## 2021-08-27 RX ADMIN — FENTANYL CITRATE 25 MICROGRAM(S): 50 INJECTION INTRAVENOUS at 14:39

## 2021-08-27 RX ADMIN — HYDROMORPHONE HYDROCHLORIDE 0.5 MILLIGRAM(S): 2 INJECTION INTRAMUSCULAR; INTRAVENOUS; SUBCUTANEOUS at 14:04

## 2021-08-27 RX ADMIN — HYDROMORPHONE HYDROCHLORIDE 0.5 MILLIGRAM(S): 2 INJECTION INTRAMUSCULAR; INTRAVENOUS; SUBCUTANEOUS at 14:14

## 2021-08-27 NOTE — BRIEF OPERATIVE NOTE - NSICDXBRIEFPREOP_GEN_ALL_CORE_FT
PRE-OP DIAGNOSIS:  Right upper quadrant pain 27-Aug-2021 13:09:35  Delfina Morin   PRE-OP DIAGNOSIS:  Right upper quadrant pain 27-Aug-2021 13:09:35  Delfina Morin  Gallstones 27-Aug-2021 16:25:00  Colt Pace

## 2021-08-27 NOTE — BRIEF OPERATIVE NOTE - OPERATION/FINDINGS
wc 1 . Robotic assisted cholecystectomy for cholelithiasis.   Critical view obtained, cystic duct and artery succefully clipped, confirmed with firefly.   gb safely removed from liver bed.   hemostasis achieved. wc 1 . Robotic assisted cholecystectomy for cholelithiasis.   Critical view obtained, cystic duct and artery successfully clipped, confirmed with firefly.

## 2021-08-27 NOTE — ASU DISCHARGE PLAN (ADULT/PEDIATRIC) - CALL YOUR DOCTOR IF YOU HAVE ANY OF THE FOLLOWING:
severe abdominal pain and/or jaundice/Bleeding that does not stop/Swelling that gets worse/Pain not relieved by Medications/Fever greater than (need to indicate Fahrenheit or Celsius)/Wound/Surgical Site with redness, or foul smelling discharge or pus/Nausea and vomiting that does not stop

## 2021-08-27 NOTE — ASU DISCHARGE PLAN (ADULT/PEDIATRIC) - MEDICATION INSTRUCTIONS
For pain control Advil 200 mg (1 tab) after breakfast, lunch, and dinner daily.  Can use Tylenol Extra Strength as directed on bottle in between Advil if needed.

## 2021-08-27 NOTE — ASU DISCHARGE PLAN (ADULT/PEDIATRIC) - ACTIVITY LEVEL
max lifting capacity 20 pounds/No excercise/No heavy lifting/No sports/gym/No tub baths/No intercourse

## 2021-08-27 NOTE — ASU DISCHARGE PLAN (ADULT/PEDIATRIC) - CARE PROVIDER_API CALL
Colt Pace (MD)  Surgery  [address] [facility], [address] [place] [address]  Phone: [phone]  Fax: [phone]  Follow Up Time:

## 2021-08-27 NOTE — BRIEF OPERATIVE NOTE - NSICDXBRIEFPOSTOP_GEN_ALL_CORE_FT
POST-OP DIAGNOSIS:  Right upper quadrant pain 27-Aug-2021 13:09:48  Delfina Morin  Cholelithiasis 27-Aug-2021 13:12:02  Delfina Morin

## 2021-08-31 LAB — SURGICAL PATHOLOGY STUDY: SIGNIFICANT CHANGE UP

## 2021-09-03 ENCOUNTER — APPOINTMENT (OUTPATIENT)
Dept: SURGERY | Facility: CLINIC | Age: 76
End: 2021-09-03
Payer: MEDICARE

## 2021-09-03 VITALS
HEART RATE: 64 BPM | DIASTOLIC BLOOD PRESSURE: 78 MMHG | RESPIRATION RATE: 14 BRPM | TEMPERATURE: 96.8 F | HEIGHT: 70 IN | BODY MASS INDEX: 22.19 KG/M2 | OXYGEN SATURATION: 99 % | WEIGHT: 155 LBS | SYSTOLIC BLOOD PRESSURE: 137 MMHG

## 2021-09-03 PROCEDURE — 99024 POSTOP FOLLOW-UP VISIT: CPT

## 2021-09-03 NOTE — PHYSICAL EXAM
[JVD] : no jugular venous distention  [No Rash or Lesion] : No rash or lesion [Purpura] : no purpura  [Petechiae] : no petechiae [Skin Ulcer] : no ulcer [Skin Induration] : no induration [Alert] : alert [Oriented to Person] : oriented to person [Oriented to Place] : oriented to place [Oriented to Time] : oriented to time [Calm] : calm [de-identified] : non toxic in no acute distress [de-identified] : NC/AT PERRL EOMI no scleral icterus [de-identified] : trachea midline no gross mass [de-identified] : no audible wheezing or stridor [de-identified] : mildly obese soft, no localizing tenderness, no guarding, no rebound, no masses [de-identified] : phallus normal, no testicular mass or tenderness [de-identified] : FROM of all extremities with no gross deformity, there remains no recurrent umbilical or inguinal hernias and no localizing tenderness [de-identified] : surgical incisions are healing well without evidence of infection  [de-identified] : mood is calm

## 2021-09-03 NOTE — ASSESSMENT
[FreeTextEntry1] : The patient is stable and doing well following a robotic cholecystectomy.  The patient will follow up in 2 weeks or sooner should any problems or issues arise.

## 2021-09-03 NOTE — HISTORY OF PRESENT ILLNESS
[de-identified] : The patient returns to the office with no abdominal pain, nausea, or vomit.  He is very pleased with his surgical results thus far.

## 2021-09-19 ENCOUNTER — APPOINTMENT (OUTPATIENT)
Dept: DISASTER EMERGENCY | Facility: CLINIC | Age: 76
End: 2021-09-19

## 2021-09-19 DIAGNOSIS — Z01.818 ENCOUNTER FOR OTHER PREPROCEDURAL EXAMINATION: ICD-10-CM

## 2021-09-20 ENCOUNTER — APPOINTMENT (OUTPATIENT)
Dept: SURGERY | Facility: CLINIC | Age: 76
End: 2021-09-20
Payer: MEDICARE

## 2021-09-20 VITALS
OXYGEN SATURATION: 95 % | SYSTOLIC BLOOD PRESSURE: 116 MMHG | HEART RATE: 64 BPM | HEIGHT: 70 IN | TEMPERATURE: 98.3 F | RESPIRATION RATE: 14 BRPM | WEIGHT: 155 LBS | DIASTOLIC BLOOD PRESSURE: 68 MMHG | BODY MASS INDEX: 22.19 KG/M2

## 2021-09-20 DIAGNOSIS — K82.8 OTHER SPECIFIED DISEASES OF GALLBLADDER: ICD-10-CM

## 2021-09-20 DIAGNOSIS — R14.0 ABDOMINAL DISTENSION (GASEOUS): ICD-10-CM

## 2021-09-20 LAB — SARS-COV-2 N GENE NPH QL NAA+PROBE: NOT DETECTED

## 2021-09-20 PROCEDURE — 99024 POSTOP FOLLOW-UP VISIT: CPT

## 2021-09-20 NOTE — HISTORY OF PRESENT ILLNESS
[de-identified] : The patient returns to the office with a complaint of an intermittent sharp pain in the right mid abdominal wall.  The patient reports that he gets a sharp stabbing pain in the right mid abdominal area that can occur at rest or during activity.  The pain lasts for only about 30 to 60 seconds then resolves.  He has no nausea, no vomit, but reports he was in the ER at [facility] for midepigastric to lower chest discomfort.  The patient reports that he is scheduled for a cardiac cath this Weds.

## 2021-09-20 NOTE — PHYSICAL EXAM
[JVD] : no jugular venous distention  [No Rash or Lesion] : No rash or lesion [Purpura] : no purpura  [Petechiae] : no petechiae [Skin Ulcer] : no ulcer [Skin Induration] : no induration [Alert] : alert [Oriented to Person] : oriented to person [Oriented to Place] : oriented to place [Oriented to Time] : oriented to time [Calm] : calm [de-identified] : non toxic in no acute distress [de-identified] : NC/AT PERRL EOMI no scleral icterus [de-identified] : trachea midline no gross mass [de-identified] : no audible wheezing or stridor [de-identified] : mildly obese soft, no localizing tenderness, no guarding, no rebound, no masses [de-identified] : phallus normal, no testicular mass or tenderness [de-identified] : FROM of all extremities with no gross deformity, there remains no recurrent umbilical or inguinal hernias and no localizing tenderness [de-identified] : surgical incisions are healed without evidence of infection  [de-identified] : mood is calm

## 2021-09-20 NOTE — ASSESSMENT
[FreeTextEntry1] : The patient is a 76 year old male who is s/p a robotic cholecystectomy.  The patient at this time appears surgically stable aside from intermittent right sided incisional discomfort that is likely post surgical in nature.  He states that the pain is brief in duration and does not limit his activity at this point.  There is no evidence of herniation.  He is to undergo a cardiac cath on Weds this week and will follow up here as needed moving forward.

## 2021-09-22 ENCOUNTER — TRANSCRIPTION ENCOUNTER (OUTPATIENT)
Age: 76
End: 2021-09-22

## 2021-09-22 ENCOUNTER — OUTPATIENT (OUTPATIENT)
Dept: OUTPATIENT SERVICES | Facility: HOSPITAL | Age: 76
LOS: 1 days | Discharge: HOME | End: 2021-09-22
Payer: COMMERCIAL

## 2021-09-22 VITALS
DIASTOLIC BLOOD PRESSURE: 74 MMHG | SYSTOLIC BLOOD PRESSURE: 174 MMHG | HEART RATE: 63 BPM | WEIGHT: 160.06 LBS | RESPIRATION RATE: 16 BRPM | OXYGEN SATURATION: 98 % | TEMPERATURE: 98 F | HEIGHT: 70 IN

## 2021-09-22 VITALS
HEART RATE: 68 BPM | SYSTOLIC BLOOD PRESSURE: 140 MMHG | OXYGEN SATURATION: 96 % | RESPIRATION RATE: 16 BRPM | DIASTOLIC BLOOD PRESSURE: 74 MMHG

## 2021-09-22 DIAGNOSIS — Z98.890 OTHER SPECIFIED POSTPROCEDURAL STATES: Chronic | ICD-10-CM

## 2021-09-22 DIAGNOSIS — Z98.49 CATARACT EXTRACTION STATUS, UNSPECIFIED EYE: Chronic | ICD-10-CM

## 2021-09-22 DIAGNOSIS — Z98.89 OTHER SPECIFIED POSTPROCEDURAL STATES: Chronic | ICD-10-CM

## 2021-09-22 DIAGNOSIS — I25.10 ATHEROSCLEROTIC HEART DISEASE OF NATIVE CORONARY ARTERY WITHOUT ANGINA PECTORIS: ICD-10-CM

## 2021-09-22 LAB
ALBUMIN SERPL ELPH-MCNC: 4.3 G/DL — SIGNIFICANT CHANGE UP (ref 3.3–5.2)
ALP SERPL-CCNC: 74 U/L — SIGNIFICANT CHANGE UP (ref 40–120)
ALT FLD-CCNC: 14 U/L — SIGNIFICANT CHANGE UP
ANION GAP SERPL CALC-SCNC: 10 MMOL/L — SIGNIFICANT CHANGE UP (ref 5–17)
AST SERPL-CCNC: 23 U/L — SIGNIFICANT CHANGE UP
BILIRUB SERPL-MCNC: 0.6 MG/DL — SIGNIFICANT CHANGE UP (ref 0.4–2)
BUN SERPL-MCNC: 14.8 MG/DL — SIGNIFICANT CHANGE UP (ref 8–20)
CALCIUM SERPL-MCNC: 9.4 MG/DL — SIGNIFICANT CHANGE UP (ref 8.6–10.2)
CHLORIDE SERPL-SCNC: 103 MMOL/L — SIGNIFICANT CHANGE UP (ref 98–107)
CHOLEST SERPL-MCNC: 173 MG/DL — SIGNIFICANT CHANGE UP
CO2 SERPL-SCNC: 27 MMOL/L — SIGNIFICANT CHANGE UP (ref 22–29)
CREAT SERPL-MCNC: 0.8 MG/DL — SIGNIFICANT CHANGE UP (ref 0.5–1.3)
GLUCOSE SERPL-MCNC: 89 MG/DL — SIGNIFICANT CHANGE UP (ref 70–99)
HCT VFR BLD CALC: 41.6 % — SIGNIFICANT CHANGE UP (ref 39–50)
HDLC SERPL-MCNC: 56 MG/DL — SIGNIFICANT CHANGE UP
HGB BLD-MCNC: 14.5 G/DL — SIGNIFICANT CHANGE UP (ref 13–17)
LIPID PNL WITH DIRECT LDL SERPL: 100 MG/DL — HIGH
MAGNESIUM SERPL-MCNC: 2.1 MG/DL — SIGNIFICANT CHANGE UP (ref 1.6–2.6)
MCHC RBC-ENTMCNC: 33 PG — SIGNIFICANT CHANGE UP (ref 27–34)
MCHC RBC-ENTMCNC: 34.9 GM/DL — SIGNIFICANT CHANGE UP (ref 32–36)
MCV RBC AUTO: 94.5 FL — SIGNIFICANT CHANGE UP (ref 80–100)
NON HDL CHOLESTEROL: 117 MG/DL — SIGNIFICANT CHANGE UP
PLATELET # BLD AUTO: 147 K/UL — LOW (ref 150–400)
POTASSIUM SERPL-MCNC: 3.9 MMOL/L — SIGNIFICANT CHANGE UP (ref 3.5–5.3)
POTASSIUM SERPL-SCNC: 3.9 MMOL/L — SIGNIFICANT CHANGE UP (ref 3.5–5.3)
PROT SERPL-MCNC: 7 G/DL — SIGNIFICANT CHANGE UP (ref 6.6–8.7)
RBC # BLD: 4.4 M/UL — SIGNIFICANT CHANGE UP (ref 4.2–5.8)
RBC # FLD: 11.9 % — SIGNIFICANT CHANGE UP (ref 10.3–14.5)
SODIUM SERPL-SCNC: 140 MMOL/L — SIGNIFICANT CHANGE UP (ref 135–145)
TRIGL SERPL-MCNC: 87 MG/DL — SIGNIFICANT CHANGE UP
WBC # BLD: 4.61 K/UL — SIGNIFICANT CHANGE UP (ref 3.8–10.5)
WBC # FLD AUTO: 4.61 K/UL — SIGNIFICANT CHANGE UP (ref 3.8–10.5)

## 2021-09-22 PROCEDURE — 85027 COMPLETE CBC AUTOMATED: CPT

## 2021-09-22 PROCEDURE — 99153 MOD SED SAME PHYS/QHP EA: CPT

## 2021-09-22 PROCEDURE — C1887: CPT

## 2021-09-22 PROCEDURE — 93458 L HRT ARTERY/VENTRICLE ANGIO: CPT | Mod: 26

## 2021-09-22 PROCEDURE — 80053 COMPREHEN METABOLIC PANEL: CPT

## 2021-09-22 PROCEDURE — 83735 ASSAY OF MAGNESIUM: CPT

## 2021-09-22 PROCEDURE — 99152 MOD SED SAME PHYS/QHP 5/>YRS: CPT

## 2021-09-22 PROCEDURE — C1894: CPT

## 2021-09-22 PROCEDURE — 80061 LIPID PANEL: CPT

## 2021-09-22 PROCEDURE — 93010 ELECTROCARDIOGRAM REPORT: CPT

## 2021-09-22 PROCEDURE — 93458 L HRT ARTERY/VENTRICLE ANGIO: CPT

## 2021-09-22 PROCEDURE — 93005 ELECTROCARDIOGRAM TRACING: CPT

## 2021-09-22 PROCEDURE — C1769: CPT

## 2021-09-22 PROCEDURE — 36415 COLL VENOUS BLD VENIPUNCTURE: CPT

## 2021-09-22 RX ORDER — FAMOTIDINE 10 MG/ML
1 INJECTION INTRAVENOUS
Qty: 0 | Refills: 0 | DISCHARGE

## 2021-09-22 RX ORDER — CHOLECALCIFEROL (VITAMIN D3) 125 MCG
3 CAPSULE ORAL
Qty: 0 | Refills: 0 | DISCHARGE

## 2021-09-22 RX ORDER — VITAMIN E 100 UNIT
1 CAPSULE ORAL
Qty: 0 | Refills: 0 | DISCHARGE

## 2021-09-22 RX ORDER — AMLODIPINE BESYLATE 2.5 MG/1
1 TABLET ORAL
Qty: 0 | Refills: 0 | DISCHARGE

## 2021-09-22 RX ORDER — OMEPRAZOLE 10 MG/1
1 CAPSULE, DELAYED RELEASE ORAL
Qty: 0 | Refills: 0 | DISCHARGE

## 2021-09-22 NOTE — H&P PST ADULT - ASSESSMENT
ASSESSMENT: 77 yo male with Known 50% LAD lesion normal NST but continued intermittent chest pain for C.      Plan: PRE-PROCEDURE ASSESSMENT  -bay aspirin pre procedure   -Patient seen and examined  -Labs reviewed  -Pre-procedure teaching completed with patient   -Questions answered about patients concerns     -instructed to NPO after midnight.   - Pt instructed to have escort to and from procedure   -pt instructed IF STENT PLACED WILL REQUIRE TO STAY OVERNIGHT FOR MONITORING AND DISCHARGED IN THE AM .

## 2021-09-22 NOTE — DISCHARGE NOTE PROVIDER - CARE PROVIDER_API CALL
Singh Poon)  Cardiology; Cardiovascular Disease; Internal Medicine  [address] [place] [address]  Phone: [phone]  Fax: [phone]  Follow Up Time:

## 2021-09-22 NOTE — DISCHARGE NOTE PROVIDER - NSDCMRMEDTOKEN_GEN_ALL_CORE_FT
amLODIPine 5 mg oral tablet: 1 tab(s) orally once a day  enalapril 20 mg oral tablet: 1 tab(s) orally once a day  famotidine 20 mg oral tablet: 1 tab(s) orally 2 times a day  Maalox Advanced 1000 mg-60 mg oral tablet, chewable: 1 tab(s) orally once a day  omeprazole 20 mg oral delayed release tablet: 1 tab(s) orally once a day  Vitamin B12 100 mcg oral tablet: 1 tab(s) orally once a day  Vitamin D3 1000 intl units oral tablet, chewable: 3 tab(s) orally once a day  vitamin E 180 mg oral capsule: 1 cap(s) orally once a day  zinc citrate 50 mg oral capsule:

## 2021-09-22 NOTE — DISCHARGE NOTE PROVIDER - HOSPITAL COURSE
Narrative: 75 yo male with 2012 known 50 LAD lesion, recent cholecystectomy in August, HTN, Prostate Ca in 2020.  Presents today for LHC for complaints of chest Pain . Although NST normal in 8/20/2020 and recent full  GI work up pt still has had intermittent chest pain  thus here for LHC.    Post LHC Now 30-40% LAD no other new disease.

## 2021-09-22 NOTE — H&P PST ADULT - HISTORY OF PRESENT ILLNESS
Narrative: 75 yo male with 2012 known 50 LAD lesion, recent cholecystectomy in August, HTN, Prostate Ca in 2020.  Presents today for C for complaints of chest Pain . Although NST normal in 8/20/2020 and recent full  GI work up pt still has had intermittent chest pain  thus here for LHC.      Mallampati 2   ASA 2   BRA 1.0%    Symptoms:        Anigina (Class): CCS II       Ischemic Symptoms: Pressure     Prior Cardiac Interventions (LHC, stents, CABG):       PCI's (Date, Stents, Vessels):        CABG (Date, Grafts):     Noninvasive Testing: Normal   Stress Test: Date: 8/2020       Protocol: NST        Duration of Exercise:        Symptoms: None        EKG Changes: None               Myocardial Imaging:        Risk Assessment (Low, Medium, High):     Echo (Date, Findings): 3/11/2021 Normal LVF 60%     Antianginal Therapies:        Beta Blockers:         Calcium Channel Blockers: Norvasc        Long Acting Nitrates:        Ranexa:     Associated Risk Factors:        Cerebrovascular Disease: N/A       Chronic Lung Disease: N/A       Peripheral Arterial Disease: N/A       Chronic Kidney Disease (if yes, what is GFR): N/A       Uncontrolled Diabetes (if yes, what is HgbA1C or FBS): N/A       Poorly Controlled Hypertension (if yes, what is SBP): N/A       Morbid Obesity (if yes, what is BMI): N/A       History of Recent Ventricular Arrhythmia: N/A       Inability to Ambulate Safely: N/A       Need for Therapeutic Anticoagulation: N/A       Antiplatelet or Contrast Allergy: N/A

## 2021-09-22 NOTE — DISCHARGE NOTE NURSING/CASE MANAGEMENT/SOCIAL WORK - PATIENT PORTAL LINK FT
You can access the FollowMyHealth Patient Portal offered by  by registering at the following website: [email or web address]. By joining [facility]’s FollowMyHealth portal, you will also be able to view your health information using other applications (apps) compatible with our system.

## 2021-09-28 DIAGNOSIS — R07.89 OTHER CHEST PAIN: ICD-10-CM

## 2021-11-05 ENCOUNTER — APPOINTMENT (OUTPATIENT)
Dept: SURGERY | Facility: CLINIC | Age: 76
End: 2021-11-05

## 2021-11-08 ENCOUNTER — APPOINTMENT (OUTPATIENT)
Dept: ORTHOPEDIC SURGERY | Facility: CLINIC | Age: 76
End: 2021-11-08
Payer: MEDICARE

## 2021-11-08 VITALS
WEIGHT: 160 LBS | DIASTOLIC BLOOD PRESSURE: 76 MMHG | HEIGHT: 70 IN | BODY MASS INDEX: 22.9 KG/M2 | TEMPERATURE: 98.1 F | HEART RATE: 65 BPM | SYSTOLIC BLOOD PRESSURE: 142 MMHG

## 2021-11-08 PROCEDURE — 73562 X-RAY EXAM OF KNEE 3: CPT | Mod: RT

## 2021-11-08 PROCEDURE — 99203 OFFICE O/P NEW LOW 30 MIN: CPT

## 2021-11-08 NOTE — PHYSICAL EXAM
[LE] : Sensory: Intact in bilateral lower extremities [ALL] : dorsalis pedis, posterior tibial, femoral, popliteal, and radial 2+ and symmetric bilaterally [Normal] : Alert and in no acute distress [Poor Appearance] : well-appearing [de-identified] : GENERAL APPEARANCE: Well nourished and hydrated, pleasant, alert, and oriented x 3. Appears their stated age. \par HEENT: Normocephalic, extraocular eye motion intact. Nasal septum midline. Oral cavity clear. External auditory canal clear. \par RESPIRATORY: Breath sounds clear and audible in all lobes. No wheezing, No accessory muscle use.\par CARDIOVASCULAR: No apparent abnormalities. No lower leg edema. No varicosities. Pedal pulses are palpable.\par NEUROLOGIC: Sensation is normal, no muscle weakness in the upper or lower extremities.\par DERMATOLOGIC: No apparent skin lesions, moist, warm, no rash.\par SPINE: Cervical spine appears normal and moves freely; thoracic spine appears normal and moves freely; lumbosacral spine appears normal and moves freely, normal, nontender.\par MUSCULOSKELETAL: Hands, wrists, and elbows are normal and move freely, shoulders are normal and move freely.  [de-identified] : Right knee exam shows medial joint line tenderness, mild effusion, FROM [de-identified] : 3V xray of the right knee done in the office today and reviewed by Dr. George Carpenter demonstrates mild medial compartmental osteoarthritis \par \par US of the right LE performed at [facility] on 10/21/21 showed the following:\par 1. No evidence for DVT in the right lower extremity.\par 2. 1.2 cm right popliteal cyst.

## 2021-11-08 NOTE — DISCUSSION/SUMMARY
[de-identified] : 75 y/o M with mild medial compartmental osteoarthritis of the right knee. We reassured the pt that he is not a candidate for a right TKA. He had an US of the right knee which showed a Baker's cyst and we explained to him that it was an incidental finding and is most likely not causing his symptoms. He notes that his symptoms are improving. He will continue to wear the compressive sleeve. If his knee swells again, he should call the office and we will send him for an MRI.

## 2021-11-08 NOTE — HISTORY OF PRESENT ILLNESS
[Pain Location] : pain [Improving] : improving [2] : a current pain level of 2/10 [0] : a minimum pain level of 0/10 [6] : a maximum pain level of 6/10 [Walking] : worsened by walking [Rest] : relieved by rest [de-identified] : 75 y/o M presents with right knee pain. He was climbing a ladder recently and started to develop pain and swelling in the right knee. Immediately after the knee swelled, he put on a compressive sleeve which gave him some relief. He has an US of the right knee on 10/21/21 which showed a Baker's cyst and no evidence of a DVT. He is not taking any antiinflammatories. He had an arthroscopy a couple of years ago, but he isn't sure which knee it was.

## 2021-11-08 NOTE — END OF VISIT
[FreeTextEntry3] : I, Francisco Garcia, acted solely as a scribe for Dr. George Carpenter on this date 11/08/2021.

## 2021-12-14 ENCOUNTER — APPOINTMENT (OUTPATIENT)
Dept: PULMONOLOGY | Facility: CLINIC | Age: 76
End: 2021-12-14
Payer: MEDICARE

## 2021-12-14 VITALS
OXYGEN SATURATION: 98 % | DIASTOLIC BLOOD PRESSURE: 74 MMHG | BODY MASS INDEX: 23.53 KG/M2 | HEART RATE: 74 BPM | RESPIRATION RATE: 14 BRPM | SYSTOLIC BLOOD PRESSURE: 134 MMHG | WEIGHT: 164 LBS

## 2021-12-14 PROCEDURE — 99213 OFFICE O/P EST LOW 20 MIN: CPT

## 2021-12-14 NOTE — DISCUSSION/SUMMARY
[FreeTextEntry1] : asthma mild intermittent, prn rescue, stable minute nodules by hx, \par never smoker, no pulmonary complaints, climbs stairs without any dyspnea\par atypical chest pain episodes last minutes, by hx Catherization negative, recent CTA negative for PE, denies benefit of rescue inhaler, advised trial of anti reflux\par lung exam is normal, normal sp02\par Saw cardiology , has clearance per pt, needs follow up echo\par has ED, compliant with CPAP, ( 8cm water per pt ) \par PFts 1/21 very mild air flow obstruction, normal TLC and DLCO- will repeat\par Fully vaccinated Pfizer, discussed booster \par add inhaled steroids preoperatively, prn rescue, technique reviewed\par follow up 3 months or sooner if needed\par Need Cardiology records\par \par

## 2021-12-14 NOTE — HISTORY OF PRESENT ILLNESS
[TextBox_4] : never smoker\par doing well, no fever, chill, chest pain\par no dyspnea\par for cholecystectomy next week\par has ED on cpap, now mild- cpap 8\par Cardiology Dr Poon [facility], [place], had Catherization negative\par  saw vascular, no DVT\par \par recent visit [facility]\par atypical chest pain\par had CTA 12/7/21 no PE

## 2021-12-14 NOTE — PROCEDURE
[FreeTextEntry1] : CXR 5/20 neg\par \par CTA 12/21 by report, no PE , no nodules or sig abn reported

## 2021-12-14 NOTE — CONSULT LETTER
[Dear  ___] : Dear  [unfilled], [Consult Letter:] : I had the pleasure of evaluating your patient, [unfilled]. [Please see my note below.] : Please see my note below. [Sincerely,] : Sincerely, [FreeTextEntry3] : Crow Wagner DO [facility]\par Pulmonary Critical Care\par Director Pulmonary Division\par Medical Director Respiratory Therapy\par [facility]\par \par

## 2022-01-12 ENCOUNTER — OUTPATIENT (OUTPATIENT)
Dept: OUTPATIENT SERVICES | Facility: HOSPITAL | Age: 77
LOS: 1 days | End: 2022-01-12
Payer: COMMERCIAL

## 2022-01-12 ENCOUNTER — APPOINTMENT (OUTPATIENT)
Dept: MRI IMAGING | Facility: CLINIC | Age: 77
End: 2022-01-12

## 2022-01-12 DIAGNOSIS — Z98.49 CATARACT EXTRACTION STATUS, UNSPECIFIED EYE: Chronic | ICD-10-CM

## 2022-01-12 DIAGNOSIS — S83.241A OTHER TEAR OF MEDIAL MENISCUS, CURRENT INJURY, RIGHT KNEE, INITIAL ENCOUNTER: ICD-10-CM

## 2022-01-12 DIAGNOSIS — Z98.890 OTHER SPECIFIED POSTPROCEDURAL STATES: Chronic | ICD-10-CM

## 2022-01-12 DIAGNOSIS — Z98.89 OTHER SPECIFIED POSTPROCEDURAL STATES: Chronic | ICD-10-CM

## 2022-01-12 PROCEDURE — 73721 MRI JNT OF LWR EXTRE W/O DYE: CPT

## 2022-01-28 ENCOUNTER — APPOINTMENT (OUTPATIENT)
Dept: ORTHOPEDIC SURGERY | Facility: CLINIC | Age: 77
End: 2022-01-28
Payer: MEDICARE

## 2022-01-28 DIAGNOSIS — S83.241A OTHER TEAR OF MEDIAL MENISCUS, CURRENT INJURY, RIGHT KNEE, INITIAL ENCOUNTER: ICD-10-CM

## 2022-01-28 DIAGNOSIS — M17.11 UNILATERAL PRIMARY OSTEOARTHRITIS, RIGHT KNEE: ICD-10-CM

## 2022-01-28 PROCEDURE — 99441: CPT

## 2022-01-28 NOTE — DISCUSSION/SUMMARY
[de-identified] : I called the patient to review his MRI.  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  He has areas of high-grade cartilage loss in the lateral patella facet with subchondral edema.  I think his kneecap is probably giving him the most of the symptoms.  He notes he has difficulty with squatting and stairs and ladders.  His pain at this point is not debilitating he is happy to know what was causing his pain he does not want to pursue any surgery or further treatment with injections.  I do think he is a reasonable candidate for injection therapy..\par \par He will follow up with us as needed

## 2022-01-28 NOTE — HISTORY OF PRESENT ILLNESS
[de-identified] : I called the patient to review his MRI.  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  He has areas of high-grade cartilage loss in the lateral patella facet with subchondral edema.  I think his kneecap is probably giving him the most of the symptoms.  He notes he has difficulty with squatting and stairs and ladders.  His pain at this point is not debilitating he is happy to know what was causing his pain he does not want to pursue any surgery or further treatment with injections.  I do think he is a reasonable candidate for injection therapy..\par \par He will follow up with us as needed

## 2022-02-24 ENCOUNTER — APPOINTMENT (OUTPATIENT)
Dept: PULMONOLOGY | Facility: CLINIC | Age: 77
End: 2022-02-24

## 2022-03-04 ENCOUNTER — APPOINTMENT (OUTPATIENT)
Dept: PULMONOLOGY | Facility: CLINIC | Age: 77
End: 2022-03-04
Payer: MEDICARE

## 2022-03-04 VITALS — HEART RATE: 52 BPM | OXYGEN SATURATION: 100 % | RESPIRATION RATE: 16 BRPM

## 2022-03-04 VITALS — WEIGHT: 167 LBS | HEIGHT: 68 IN | BODY MASS INDEX: 25.31 KG/M2

## 2022-03-04 DIAGNOSIS — I27.20 PULMONARY HYPERTENSION, UNSPECIFIED: ICD-10-CM

## 2022-03-04 DIAGNOSIS — J45.20 MILD INTERMITTENT ASTHMA, UNCOMPLICATED: ICD-10-CM

## 2022-03-04 DIAGNOSIS — R07.89 OTHER CHEST PAIN: ICD-10-CM

## 2022-03-04 PROCEDURE — 94010 BREATHING CAPACITY TEST: CPT

## 2022-03-04 PROCEDURE — 99214 OFFICE O/P EST MOD 30 MIN: CPT | Mod: 25

## 2022-03-04 RX ORDER — ALBUTEROL SULFATE 90 UG/1
108 (90 BASE) INHALANT RESPIRATORY (INHALATION)
Qty: 1 | Refills: 2 | Status: COMPLETED | COMMUNITY
Start: 2021-01-11 | End: 2022-03-04

## 2022-03-04 NOTE — DISCUSSION/SUMMARY
[FreeTextEntry1] : asthma mild intermittent, prn rescue, stable minute nodules by hx, last CXR 5/20 neg, will repeat\par atypical CP, appears to be GI related, better on proton pump, LHC no obstruction\par never smoker, no pulmonary complaints, exercises regularly\par lung exam is normal, normal sp02\par discussed ICS/LABA, not interested currently, denies any benefit of albuterol \par cardiology following, await repeat echocardiogram\par has ED, compliant with CPAP, ( 8cm water per pt ) \par Fully vaccinated Pfizer, discussed booster \par follow up 6 months or sooner if needed, will call with CXR\par \par \par

## 2022-03-04 NOTE — PROCEDURE
[FreeTextEntry1] : CXR 5/20 neg\par Spirometry today- mild air flow obstruction, no sig change from 1/21

## 2022-03-04 NOTE — HISTORY OF PRESENT ILLNESS
[TextBox_4] : never smoker\par doing well, no fever, chill, \par no dyspnea\par has ED on cpap, now mild- cpap 8\par Cardiology Dr [facility]\par  saw vascular, no DVT\par has atypical  CP x yrs, Cardiology work up negative\par used albuterol prn, states it doesn’t help

## 2022-03-04 NOTE — CONSULT LETTER
[Dear  ___] : Dear  [unfilled], [Consult Letter:] : I had the pleasure of evaluating your patient, [unfilled]. [Please see my note below.] : Please see my note below. [Sincerely,] : Sincerely, [FreeTextEntry3] : Crow [facility] DO [facility]\par Pulmonary Critical Care\par Director Pulmonary Division\par Medical Director Respiratory Therapy\par [facility]\par \par  [DrErica  ___] : Dr. ALFORD

## 2022-03-04 NOTE — PHYSICAL EXAM
[No Acute Distress] : no acute distress [Normal Rate/Rhythm] : normal rate/rhythm [Normal S1, S2] : normal s1, s2 [No Murmurs] : no murmurs [No Resp Distress] : no resp distress [No Acc Muscle Use] : no acc muscle use [Clear to Auscultation Bilaterally] : clear to auscultation bilaterally [Normal Gait] : normal gait [No Abnormalities] : no abnormalities [No Clubbing] : no clubbing [TextBox_105] : 1 plus edema

## 2022-04-07 ENCOUNTER — APPOINTMENT (OUTPATIENT)
Dept: ORTHOPEDIC SURGERY | Facility: CLINIC | Age: 77
End: 2022-04-07
Payer: MEDICARE

## 2022-04-07 DIAGNOSIS — G56.23 LESION OF ULNAR NERVE, BILATERAL UPPER LIMBS: ICD-10-CM

## 2022-04-07 DIAGNOSIS — M19.031 PRIMARY OSTEOARTHRITIS, RIGHT WRIST: ICD-10-CM

## 2022-04-07 DIAGNOSIS — M19.032 PRIMARY OSTEOARTHRITIS, LEFT WRIST: ICD-10-CM

## 2022-04-07 PROCEDURE — 73110 X-RAY EXAM OF WRIST: CPT | Mod: RT

## 2022-04-07 NOTE — PHYSICAL EXAM
[Left] : left hand [Right] : right hand [1st] : 1st [CMC Joint] : CMC joint [Bilateral] : wrists bilaterally [] : negative Phalen's [FreeTextEntry9] : f [de-identified] : FDI and thumb abduction 5/5  [FreeTextEntry8] : B/l Stage 3-4 cmc arthritis \par RIght distal radius shaft fixation hardware \par LEFT slight SL widening with clenched fist and radio scaphoid joint narrowing \par B/l STT degenerative changes

## 2022-04-07 NOTE — HISTORY OF PRESENT ILLNESS
[Gradual] : gradual [Result of repetitive motion] : result of repetitive motion [4] : 4 [2] : 2 [Burning] : burning [Dull/Aching] : dull/aching [Radiating] : radiating [Shooting] : shooting [Tingling] : tingling [Constant] : constant [Household chores] : household chores [Leisure] : leisure [Heat] : heat [Walking/activity] : walking/activity [Exercising] : exercising [Retired] : Work status: retired [de-identified] : NI B/L hands. Pt states that his 3-4 years ago, he performed an EKG which found moderate carpal tunnel in the right and sever in the left, as well as arthritis. Pain is located at the thumb. Pt was given a brace for support with minimal relief. Left hand pain is more severe than right hand, associated with burning and numbness, no hx of hand pain or injury. Pt is highly active doing yardwork, physical activity, etc. [] : no [FreeTextEntry1] : B/L Hands [FreeTextEntry5] : NI B/L hands. Pt states that his 3-4 years ago, he performed an EKG which found moderate carpal tunnel in the right and sever in the left, as well as arthritis. Pain is located at the thumb and numbness in the ring and pinky fingers. Pt visited urgent care, XR was performed where arthritis was found, pt was given a brace for support with minimal relief. Left hand pain is more severe than right hand, associated with burning and numbness, no hx of hand pain or injury. Pt is highly active doing yard work, physical activity, etc.   Activity make the pain worse.  Warm water decreases symptoms [FreeTextEntry7] : B/L wrists [FreeTextEntry9] : Brace [de-identified] : Grabbing [de-identified] : XR

## 2022-05-02 ENCOUNTER — APPOINTMENT (OUTPATIENT)
Dept: ORTHOPEDIC SURGERY | Facility: CLINIC | Age: 77
End: 2022-05-02

## 2022-05-02 NOTE — H&P PST ADULT - GENITOURINARY
Daily Note     Today's date: 2022  Patient name: Kylah Miller  : 1959  MRN: [id number]  Referring provider: Jenelle Vieira MD  Dx:   Encounter Diagnosis     ICD-10-CM    1  Rotator cuff tendonitis, left  M75 [id number]        Start Time: 1615  Stop Time: 1700  Total time in clinic (min): 45 minutes   Patient 1 on 1 with PT from [phone]  Subjective: Patient reports 2 days of improvements following last session  States it got re-irritated after work  Objective: See treatment diary below      Assessment: 30 degree of flexion improvement in active range of motion and pain levels following manuals and end of session    Patient would benefit from continued PT      Plan: Continue per plan of care        Precautions: DM      Manuals          PROM nv AFB AB ID         GHJ mobs (post/infer) nv nv           GHJ Distraction nv nv           assessment   5'          STM pec mi/justin mj   Static and dynamic Static and dynamic         Neuro Re-Ed  5/2         ER/IR Isometrics nv nv           Crossover Symmetry Rows    Grn 2x10                                                                          Ther Ex  5/2         Table Slides Flexion HEP 5"x10           Wall Slides w/ Towel HEP            Supine Flexion AAROM w/ Cane HEP 5"x10 10x  2 sets 2x10 flex/ER         Sidelying ER nv 1x10           Sidelying Flexion nv nv           ranger nv 1x10           Wall slides flexion   2x10 2x10         Pullies nv 5 min  5'         Ther Activity                                      Gait Training                                       Modalities
details…

## 2022-05-16 NOTE — ASU DISCHARGE PLAN (ADULT/PEDIATRIC). - DO NOT DRIVE IF TAKING PAIN MEDICATION
Statement Selected
Patient seen and examined on 5/16/22.    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     #SOB, pt. reporting worsening DESHPANDE for one week. Breathing is okay at rest. Sating well on RA. PE ruled out on CT in the outpatient setting however there was concern for pulmonary edema given new ground glass opacities noted. Differential diagnosis for SOB includes CHF vs progression of lymphoma with pulmonary involvement vs COPD exacerbation vs PNA. Unclear cardiac history, conflicting echo reports. One echo reporting EF of 26% and the most recent echo with no LV dysfunction. BNP elevated on this admission. Possibly CHF? Will obtain echo. Lasix 20mg IVP x1 for now and will monitor urine output. Will discuss status of lymphoma with oncologist in AM. If concerning for progression - will consider pulm consult. COPD exacerbation less likely - denies change in sputum and minimal cough, no wheezing auscultated, RVP negative. Duonebs. Less likely PNA - afebrile, no leukocytosis and procal low. Will monitor off of antibiotics for now.     Plan discussed with HS.

## 2022-09-01 NOTE — PRE-OP CHECKLIST - TEMPERATURE IN FAHRENHEIT (DEGREES F)
Kev Veliz is a 24 month old male presenting for well exam. Denies known Latex allergy or symptoms of Latex sensitivity. Medications reviewed. Has no known allergies. Child accompanied by mother, father and brother  Mother's work status: full time  DIET:      Milk:  whole      Frequency : 24 ounces / day      Diet: eating variety of finger foods      Appetite : good      Food intolerance/dislikes: no      Water : private well City: [place]  STOOLS: 1 /day  SLEEPING:      Naps - 3 hour/day      Night - 10 hour  IMMUNIZATION REACTIONS: NO  VARICELLA STATUS: documented one dose of vaccine.  GROWTH AND DEVELOPMENT       Spoon spilling - YES       Scribbles - YES       Points to body parts - YES       Kicks, jumps, runs, throws - YES       Combines 2 words - NO  MEDICATIONS: Patient is not currently taking medication  CONCERNS:   Ear infections and speech             97.7

## 2022-09-07 ENCOUNTER — APPOINTMENT (OUTPATIENT)
Dept: PULMONOLOGY | Facility: CLINIC | Age: 77
End: 2022-09-07

## 2022-09-07 VITALS
SYSTOLIC BLOOD PRESSURE: 132 MMHG | HEIGHT: 68 IN | WEIGHT: 162 LBS | OXYGEN SATURATION: 97 % | BODY MASS INDEX: 24.55 KG/M2 | HEART RATE: 56 BPM | DIASTOLIC BLOOD PRESSURE: 68 MMHG | RESPIRATION RATE: 16 BRPM

## 2022-09-07 PROCEDURE — 99214 OFFICE O/P EST MOD 30 MIN: CPT

## 2022-09-07 RX ORDER — EZETIMIBE 10 MG/1
10 TABLET ORAL
Refills: 0 | Status: ACTIVE | COMMUNITY

## 2022-09-07 RX ORDER — METOPROLOL TARTRATE 50 MG/1
50 TABLET, FILM COATED ORAL
Refills: 0 | Status: ACTIVE | COMMUNITY

## 2022-09-07 RX ORDER — AMLODIPINE BESYLATE 5 MG/1
5 TABLET ORAL
Refills: 0 | Status: DISCONTINUED | COMMUNITY
Start: 2020-09-16 | End: 2022-09-07

## 2022-09-07 NOTE — CONSULT LETTER
[Dear  ___] : Dear  [unfilled], [Consult Letter:] : I had the pleasure of evaluating your patient, [unfilled]. [Please see my note below.] : Please see my note below. [Sincerely,] : Sincerely, [FreeTextEntry3] : Crow Wagner DO [facility]\par Pulmonary Critical Care\par Director Pulmonary Division\par Medical Director Respiratory Therapy\par [facility]\par \par  [DrErica  ___] : Dr. ALFORD

## 2022-09-07 NOTE — DISCUSSION/SUMMARY
[FreeTextEntry1] : Asthma , mild intermittent/persistent,\par never smoker, no pulmonary complaints, exercises regularly\par lung exam is normal, normal sp02\par CXR 3/22 negative\par discussed ICS/LABA, not interested currently, denies any benefit of albuterol \par cardiology following, await repeat echocardiogram\par has ED, compliant with CPAP, ( 8cm water per pt ) \par follow up 6 months or sooner if needed\par \par \par

## 2022-11-18 ENCOUNTER — APPOINTMENT (OUTPATIENT)
Dept: ORTHOPEDIC SURGERY | Facility: CLINIC | Age: 77
End: 2022-11-18

## 2022-11-18 VITALS — HEIGHT: 68 IN | WEIGHT: 162 LBS | BODY MASS INDEX: 24.55 KG/M2

## 2022-11-18 DIAGNOSIS — M19.011 PRIMARY OSTEOARTHRITIS, RIGHT SHOULDER: ICD-10-CM

## 2022-11-18 DIAGNOSIS — M77.8 OTHER ENTHESOPATHIES, NOT ELSEWHERE CLASSIFIED: ICD-10-CM

## 2022-11-18 PROCEDURE — J3490M: CUSTOM

## 2022-11-18 PROCEDURE — 20611 DRAIN/INJ JOINT/BURSA W/US: CPT | Mod: RT

## 2022-11-18 PROCEDURE — 99203 OFFICE O/P NEW LOW 30 MIN: CPT | Mod: 25

## 2022-11-18 NOTE — PHYSICAL EXAM
[Sitting] : sitting [5___] : external rotation 5[unfilled]/5 [Outside films reviewed] : Outside films reviewed [There are no fractures, subluxations or dislocations. No significant abnormalities are seen] : There are no fractures, subluxations or dislocations. No significant abnormalities are seen [] : no atrophy [Right] : right shoulder [Degenerative change] : Degenerative change [TWNoteComboBox7] : active forward flexion 170 degrees [TWNoteComboBox6] : internal rotation L3 [de-identified] : external rotation 55 degrees

## 2022-11-18 NOTE — DISCUSSION/SUMMARY
[de-identified] : Patient allowed to gently start resuming activities. \par Discussed change to medication prescription and usage. \par Offered cortisone steroid injection. \par Bracing options discussed with the pt\par \par RE:  MARCELLUS LAWSON \par \par Acct #- [id number] \par \par Attention:  Nurse Reviewer /Medical Director\par \par  \par Based on my patient's condition, I strongly believe that the MRI is medically.necessary.  \par The patient has failed oral meds, injections and PT and conservative treatment in combination or by themselves and therefore needs the MRI.  \par The MRI will dictate further treatment t recommendations.\par \par

## 2022-11-18 NOTE — HISTORY OF PRESENT ILLNESS
[Gradual] : gradual [5] : 5 [3] : 3 [Stabbing] : stabbing [Constant] : constant [Heat] : heat [Sitting] : sitting [Retired] : Work status: retired [de-identified] : 77 year RHD  old male with pain in the right shoulder, symptoms started about 4 months ago, no specific injury. He was seen by his PCP and underwent Xrays of the shoulder, referred for consultation. No trouble sleeping at night, pain with certain rotation. No prior history of injury and no radicular complaints. Tried ITC and Rory Rousseau without help. History of Cerv fusion [] : no [FreeTextEntry1] : right shoulder [FreeTextEntry4] : august 2022 [FreeTextEntry5] : nothing special injury, fills pain for the last 4 month  [FreeTextEntry7] : to the neck [FreeTextEntry8] : no [de-identified] : X-ray  done october 13

## 2022-11-26 NOTE — ASU DISCHARGE PLAN (ADULT/PEDIATRIC). - NOTIFY
Patient discharged with v/s stable. Written and verbal after care instructions 
given and explained. 

Patient alert, oriented and verbalized understanding of instructions. 
Ambulatory with steady gait. All questions addressed prior to discharge. ID 
band removed. Patient advised to follow up with PMD. Rx of ZOFRAN given. 
Patient educated on indication of medication including possible reaction and 
side effects. Opportunity to ask questions provided and answered. severe abdominal pain and vomiting/Bleeding that does not stop/Swelling that continues/Pain not relieved by Medications/Fever greater than 101/Persistent Nausea and Vomiting/Unable to Urinate

## 2022-11-28 ENCOUNTER — RESULT REVIEW (OUTPATIENT)
Age: 77
End: 2022-11-28

## 2022-12-09 ENCOUNTER — APPOINTMENT (OUTPATIENT)
Dept: ORTHOPEDIC SURGERY | Facility: CLINIC | Age: 77
End: 2022-12-09

## 2022-12-16 ENCOUNTER — APPOINTMENT (OUTPATIENT)
Dept: ORTHOPEDIC SURGERY | Facility: CLINIC | Age: 77
End: 2022-12-16

## 2022-12-16 VITALS — BODY MASS INDEX: 22.9 KG/M2 | WEIGHT: 160 LBS | HEIGHT: 70 IN

## 2022-12-16 DIAGNOSIS — M75.111 INCOMPLETE ROTATOR CUFF TEAR OR RUPTURE OF RIGHT SHOULDER, NOT SPECIFIED AS TRAUMATIC: ICD-10-CM

## 2022-12-16 DIAGNOSIS — M75.41 IMPINGEMENT SYNDROME OF RIGHT SHOULDER: ICD-10-CM

## 2022-12-16 PROCEDURE — 99214 OFFICE O/P EST MOD 30 MIN: CPT

## 2022-12-16 NOTE — DISCUSSION/SUMMARY
[de-identified] : Patient allowed to gently start resuming activities. \par Discussed change to medication prescription and usage. \par Bracing options discussed with patient. \par Activity modification as needed\par Discussed poss future surgery, pt deciding\par arthroscopy R shoulder RCR and sad surg discussion questions answered\par 12/16/2022 \par \par  RE:  MARCELLUS LAWSON \par \par Acct #- [id number] \par \par \par Attention:  Nurse Reviewer /Medical Director\par \par I am writing this letter as a medical necessity for PT program.\par Patient has tried analgesics, non-steroid anti-inflammatory agents, \par hot or cold compresses,injections of corticosteroids, etc)  which in combination or by themselves has not worked.\par Based on my patient's condition, I strongly believe that the PT is medically needed.\par  \par Thank you for your time and consideration.   \par \par

## 2022-12-16 NOTE — HISTORY OF PRESENT ILLNESS
[Gradual] : gradual [Dull/Aching] : dull/aching [Radiating] : radiating [Sharp] : sharp [Tingling] : tingling [Rest] : rest [Retired] : Work status: retired [de-identified] : pain in the right shoulder, symptoms started about 4 months ago, no specific injury. He had MRI, last csi helped a bit, certain motions make him feel worse [5] : 5 [3] : 3 [Stabbing] : stabbing [Constant] : constant [Heat] : heat [Sitting] : sitting [] : no [FreeTextEntry1] : right shoulder [FreeTextEntry3] : a [FreeTextEntry4] : august 2022 [FreeTextEntry5] : nothing special injury, fills pain for the last 4 month  [FreeTextEntry7] : to the neck [FreeTextEntry8] : no [de-identified] : sleeping on the arm, certain movements  [de-identified] : MRI [de-identified] : X-ray  done october 13

## 2022-12-16 NOTE — PHYSICAL EXAM
[Right] : right shoulder [Sitting] : sitting [5___] : external rotation 5[unfilled]/5 [] : motor and sensory intact distally [TWNoteComboBox7] : active forward flexion 170 degrees [TWNoteComboBox6] : internal rotation L3 [de-identified] : external rotation 55 degrees

## 2023-01-31 ENCOUNTER — APPOINTMENT (OUTPATIENT)
Dept: ORTHOPEDIC SURGERY | Facility: CLINIC | Age: 78
End: 2023-01-31
Payer: MEDICARE

## 2023-01-31 VITALS
HEIGHT: 70 IN | WEIGHT: 165 LBS | HEART RATE: 61 BPM | TEMPERATURE: 98.1 F | DIASTOLIC BLOOD PRESSURE: 101 MMHG | BODY MASS INDEX: 23.62 KG/M2 | SYSTOLIC BLOOD PRESSURE: 175 MMHG

## 2023-01-31 DIAGNOSIS — M25.521 PAIN IN RIGHT ELBOW: ICD-10-CM

## 2023-01-31 PROCEDURE — 73030 X-RAY EXAM OF SHOULDER: CPT | Mod: RT

## 2023-01-31 PROCEDURE — 20550 NJX 1 TENDON SHEATH/LIGAMENT: CPT | Mod: 59,RT

## 2023-01-31 PROCEDURE — 99214 OFFICE O/P EST MOD 30 MIN: CPT | Mod: 25

## 2023-01-31 PROCEDURE — 20610 DRAIN/INJ JOINT/BURSA W/O US: CPT | Mod: RT

## 2023-01-31 PROCEDURE — 99204 OFFICE O/P NEW MOD 45 MIN: CPT | Mod: 25

## 2023-01-31 NOTE — PHYSICAL EXAM
[de-identified] : He is well-appearing on examination\par \par Examination of the right shoulder reveals equal active and passive motion as compared to the contralateral side.\par He has a positive Dickerson, Wesley, Speed tests with discomfort at the bicipital groove upon compression\par \par Examination of the [right] elbow reveals tenderness at the level of the medial epicondyle. There is pain with resisted wrist flexion at the elbow.\par  [de-identified] : [4] views of right shoulder were obtained today in my office and were seen by me and discussed with the patient. \par These are positive for subacromial spurring as well as right AC joint osteoarthritis\par

## 2023-01-31 NOTE — HISTORY OF PRESENT ILLNESS
[FreeTextEntry1] : Josse is a very pleasant 77-year-old male who presents today with multiple complaints including right shoulder as well as right elbow pain.  He has had right elbow pain for a few years now.  He did have an injection before but he thinks it was to the cubital tunnel.  He complains of forearm soreness with activities that require lifting as well as elbow pain.\par \par He also complains of right shoulder pain for 4 months now atraumatic this wakes him up at night and has difficulty with activities that require overhead motion and strength

## 2023-01-31 NOTE — ASSESSMENT
[FreeTextEntry1] : ASSESSMENT:\par \par The patient comes in today with chronic exacerbated symptoms of right elbow pain in the form of golfers elbow tendinopathy with discomfort in the forearm as well as right shoulder pain that has gotten worse in the last 4 months.  At this point in time he elected for injection therapy at the shoulder and the elbow as well as physical therapy.\par [I have diagnosed the patient today with a new diagnosis - This may diminish bodily function for the extremity.] \par \par [The patient was given an injection(s) today.]\par Injection:\par \par The risks and benefits of a steroid injection were discussed in detail. The risks include but are not limited to: pain, infection, swelling, flare response, bleeding, subcutaneous fat atrophy, skin depigmentation and/or elevation of blood sugar. The risk of incomplete resolution of symptoms, recurrence and additional intervention was reviewed and considered by the patient. \par The patient agreed to proceed and under a sterile prep, I injected 1 unit into 2 cc of a combination of Celestone and Lidocaine into the right elbow medial epicondyle sheath. The patient tolerated the injection well.\par \par Right SUBACROMIAL SHOULDER INJECTION\par \par Indication:  subacromial bursitis/impingement, pain\par \par Risk, benefits and alternatives were discussed with the patient. Potential complications include bleeding and infection. \par Alcohol was used to prep the area.  Ethyl chloride spray was used as a topical anesthetic.  Using sterile technique, the injection needle was then directed from a standard posterior approach parallel to and inferior to the acromion. .  \par A 21g needle was used to inject 5 mL of 1% Lidocaine and 2 mL Kenalog.  No significant resistance was encountered.   \par A bandage was applied.  The patient tolerated the procedure well.   \par \par Patient instructed to avoid strenuous activity for 2 day(s). \par Specifically counseled regarding the signs and symptoms of potential infection and instructed to present promptly to clinic or hospital if such signs and symptoms arise.\par \par \par The patient was adequately and thoroughly informed of my assessment of their current condition(s). \par \par \par DISCUSSION:\par 1.  I am hopeful that the injection to the right shoulder as well as the right elbow will help with the tendinopathy at the shoulder and the medial epicondylitis at the elbow\par \par 2.  I am also hopeful that physical therapy would be of significant benefit to him.\par \par 3.  I would like to see him back in 3 months time to reassess clinically\par \par \par

## 2023-01-31 NOTE — CONSULT LETTER
[Dear  ___] : Dear  [unfilled], [Consult Letter:] : I had the pleasure of evaluating your patient, [unfilled]. [Please see my note below.] : Please see my note below. [Consult Closing:] : Thank you very much for allowing me to participate in the care of this patient.  If you have any questions, please do not hesitate to contact me. [Sincerely,] : Sincerely, [FreeTextEntry2] : Kulwant Alcala MD\[address]\[place] [address] [FreeTextEntry3] : Pheonix Benitez MD

## 2023-02-17 ENCOUNTER — OFFICE (OUTPATIENT)
Dept: URBAN - METROPOLITAN AREA CLINIC 6 | Facility: CLINIC | Age: 78
Setting detail: OPHTHALMOLOGY
End: 2023-02-17
Payer: MEDICARE

## 2023-02-17 DIAGNOSIS — H01.001: ICD-10-CM

## 2023-02-17 DIAGNOSIS — H01.004: ICD-10-CM

## 2023-02-17 DIAGNOSIS — H01.005: ICD-10-CM

## 2023-02-17 DIAGNOSIS — H01.002: ICD-10-CM

## 2023-02-17 PROBLEM — H16.223 DRY EYE SYNDROME K SICCA; BOTH EYES: Status: ACTIVE | Noted: 2023-02-17

## 2023-02-17 PROCEDURE — 92014 COMPRE OPH EXAM EST PT 1/>: CPT | Performed by: OPHTHALMOLOGY

## 2023-02-17 ASSESSMENT — REFRACTION_CURRENTRX
OD_ADD: +2.25
OD_SPHERE: -0.25
OS_SPHERE: +0.75
OD_OVR_VA: 20/
OD_CYLINDER: SPHERE
OS_OVR_VA: 20/
OS_AXIS: 82
OS_CYLINDER: -0.50
OD_VPRISM_DIRECTION: PROGS
OS_ADD: +2.25
OS_VPRISM_DIRECTION: PROGS

## 2023-02-17 ASSESSMENT — REFRACTION_MANIFEST
OS_SPHERE: PLANO
OD_SPHERE: +0.50
OS_VA1: 20/NI
OD_CYLINDER: -0.50
OD_VA1: 20/20
OD_AXIS: 90
OS_AXIS: 130
OS_CYLINDER: -1.00

## 2023-02-17 ASSESSMENT — DECREASING TEAR LAKE - SEVERITY SCORE
OD_DEC_TEARLAKE: 1+
OS_DEC_TEARLAKE: 1+

## 2023-02-17 ASSESSMENT — KERATOMETRY
OS_K1POWER_DIOPTERS: 40.00
METHOD_AUTO_MANUAL: AUTO
OS_AXISANGLE_DEGREES: 159
OS_K2POWER_DIOPTERS: 40.75
OD_K1POWER_DIOPTERS: 40.50
OD_K2POWER_DIOPTERS: 40.50
OD_AXISANGLE_DEGREES: 90

## 2023-02-17 ASSESSMENT — AXIALLENGTH_DERIVED
OD_AL: 24.6411
OS_AL: 24.7448
OD_AL: 24.6943

## 2023-02-17 ASSESSMENT — TONOMETRY
OD_IOP_MMHG: 15
OS_IOP_MMHG: 15

## 2023-02-17 ASSESSMENT — LID EXAM ASSESSMENTS
OS_BLEPHARITIS: LLL LUL
OD_BLEPHARITIS: RLL RUL

## 2023-02-17 ASSESSMENT — REFRACTION_AUTOREFRACTION
OS_AXIS: 86
OS_SPHERE: +0.50
OD_SPHERE: +0.25
OS_CYLINDER: -0.75
OD_AXIS: 087
OD_CYLINDER: -0.25

## 2023-02-17 ASSESSMENT — VISUAL ACUITY
OS_BCVA: 20/20
OD_BCVA: 20/20-2

## 2023-02-17 ASSESSMENT — SPHEQUIV_DERIVED
OD_SPHEQUIV: 0.25
OS_SPHEQUIV: 0.125
OD_SPHEQUIV: 0.125

## 2023-02-17 ASSESSMENT — CONFRONTATIONAL VISUAL FIELD TEST (CVF)
OD_FINDINGS: FULL
OS_FINDINGS: FULL

## 2023-03-22 ENCOUNTER — APPOINTMENT (OUTPATIENT)
Dept: SURGERY | Facility: CLINIC | Age: 78
End: 2023-03-22
Payer: MEDICARE

## 2023-03-22 VITALS
BODY MASS INDEX: 24.34 KG/M2 | HEIGHT: 70 IN | DIASTOLIC BLOOD PRESSURE: 77 MMHG | SYSTOLIC BLOOD PRESSURE: 156 MMHG | OXYGEN SATURATION: 97 % | HEART RATE: 57 BPM | WEIGHT: 170 LBS | TEMPERATURE: 97.7 F | RESPIRATION RATE: 16 BRPM

## 2023-03-22 DIAGNOSIS — R10.9 UNSPECIFIED ABDOMINAL PAIN: ICD-10-CM

## 2023-03-22 DIAGNOSIS — Z87.19 OTHER SPECIFIED POSTPROCEDURAL STATES: ICD-10-CM

## 2023-03-22 DIAGNOSIS — Z98.890 OTHER SPECIFIED POSTPROCEDURAL STATES: ICD-10-CM

## 2023-03-22 PROCEDURE — 99214 OFFICE O/P EST MOD 30 MIN: CPT

## 2023-03-22 NOTE — PHYSICAL EXAM
[JVD] : no jugular venous distention  [No Rash or Lesion] : No rash or lesion [Purpura] : no purpura  [Petechiae] : no petechiae [Skin Ulcer] : no ulcer [Skin Induration] : no induration [Alert] : alert [Oriented to Person] : oriented to person [Oriented to Place] : oriented to place [Oriented to Time] : oriented to time [Calm] : calm [de-identified] : non toxic in no acute distress [de-identified] : NC/AT PERRL EOMI no scleral icterus [de-identified] : trachea midline no gross mass [de-identified] : no audible wheezing or stridor [de-identified] : mildly obese soft, no localizing tenderness, no guarding, no rebound, no masses [de-identified] : phallus normal, no testicular mass or tenderness [de-identified] : FROM of all extremities with no gross deformity, there remains no evidence recurrent umbilical or inguinal hernias and no localizing tenderness [de-identified] : surgical incisions are healed  [de-identified] : mood is calm

## 2023-03-22 NOTE — ASSESSMENT
[FreeTextEntry1] : The patient is a 77 year old male with pain in the right groin, flank and right abdominal area.  The patient had a prior right inguinal hernia repaired open then again laparoscopically, followed by a prostatectomy and robotic cholecystectomy.  The patient at this time warrants a CT scan to further assess the mesh and to exclude an intra-abdominal source of the pain.  Further recommendations will follow review of the CT scan.  A total of 30 minutes was spent coordinating the patient's care.

## 2023-03-22 NOTE — HISTORY OF PRESENT ILLNESS
[de-identified] : The patient comes to the office with complaints of right sided groin and flank pain that radiates to the upper abdominal area.  The patient reports that the pain will at times be so severe that he can not stand up after bending. He describes the pain as a sharp burning pain. He at times will have associated nausea, no vomit.

## 2023-04-19 ENCOUNTER — APPOINTMENT (OUTPATIENT)
Dept: SURGERY | Facility: CLINIC | Age: 78
End: 2023-04-19
Payer: MEDICARE

## 2023-04-19 VITALS
HEART RATE: 41 BPM | OXYGEN SATURATION: 98 % | TEMPERATURE: 96.4 F | RESPIRATION RATE: 16 BRPM | HEIGHT: 70 IN | SYSTOLIC BLOOD PRESSURE: 137 MMHG | BODY MASS INDEX: 24.34 KG/M2 | WEIGHT: 170 LBS | DIASTOLIC BLOOD PRESSURE: 86 MMHG

## 2023-04-19 DIAGNOSIS — R10.31 RIGHT LOWER QUADRANT PAIN: ICD-10-CM

## 2023-04-19 PROCEDURE — 99213 OFFICE O/P EST LOW 20 MIN: CPT

## 2023-04-19 NOTE — HISTORY OF PRESENT ILLNESS
[de-identified] : The patient returns to the office with much improvement in the right groin pain.  The patient has no abdominal pain, nausea, or vomit.  He denies feeling any lumps in the groins.

## 2023-04-19 NOTE — ASSESSMENT
[FreeTextEntry1] : The patient is a 77 year old male with improvement in the right groin pain.  The patient has no recurrent hernia clinically and upon review of the imaging.  The patient will follow up as needed from this point forward.  A total of 20 minutes was spent coordinating the patient's care.

## 2023-04-19 NOTE — DATA REVIEWED
[FreeTextEntry1] : Independent review of the abd/pel CT scan reveals no evidence of recurrent right inguinal hernia, there is a reading of a recurrent left inguinal hernia, although review of the images reveals normal fat within the left spermatic cord and no recurrent left inguinal hernia

## 2023-04-19 NOTE — PHYSICAL EXAM
[JVD] : no jugular venous distention  [No Rash or Lesion] : No rash or lesion [Purpura] : no purpura  [Petechiae] : no petechiae [Skin Ulcer] : no ulcer [Skin Induration] : no induration [Alert] : alert [Oriented to Person] : oriented to person [Oriented to Place] : oriented to place [Oriented to Time] : oriented to time [Calm] : calm [de-identified] : non toxic in no acute distress [de-identified] : NC/AT PERRL EOMI no scleral icterus [de-identified] : trachea midline no gross mass [de-identified] : no audible wheezing or stridor [de-identified] : mildly obese soft, no localizing tenderness, no guarding, no rebound, no masses [de-identified] : phallus normal, no testicular mass or tenderness [de-identified] : FROM of all extremities with no gross deformity, there remains no evidence recurrent umbilical or inguinal hernias and no localizing tenderness [de-identified] : surgical incisions are healed  [de-identified] : mood is calm

## 2023-05-02 ENCOUNTER — APPOINTMENT (OUTPATIENT)
Dept: ORTHOPEDIC SURGERY | Facility: CLINIC | Age: 78
End: 2023-05-02
Payer: MEDICARE

## 2023-05-02 VITALS
DIASTOLIC BLOOD PRESSURE: 88 MMHG | HEART RATE: 57 BPM | TEMPERATURE: 98.1 F | WEIGHT: 170 LBS | BODY MASS INDEX: 24.34 KG/M2 | HEIGHT: 70 IN | SYSTOLIC BLOOD PRESSURE: 165 MMHG

## 2023-05-02 PROCEDURE — 99214 OFFICE O/P EST MOD 30 MIN: CPT

## 2023-05-02 NOTE — PHYSICAL EXAM
[de-identified] : He is well-appearing on examination\par \par Examination of the right shoulder reveals equal active and passive motion as compared to the contralateral side.\par He has a positive Dickerson, Wesley, Speed tests with discomfort at the bicipital groove upon compression\par  [de-identified] : [4] views of right shoulder were discussed with the patient. \par These are positive for subacromial spurring as well as right AC joint osteoarthritis\par

## 2023-05-02 NOTE — ED PROVIDER NOTE - PSYCHIATRIC, MLM
[facility]:  [phone]  Their office is on the back side/lower level of [facility] at 2253 WVUMedicine Barnesville Alert and oriented to person, place, time/situation. normal mood and affect. no apparent risk to self or others.

## 2023-05-02 NOTE — HISTORY OF PRESENT ILLNESS
[FreeTextEntry1] : Josse returns for follow-up with a known history of right shoulder pain.  So far he has tried activity modification injection and therapy and symptoms persist.  He did have an MRI prior to us beginning treatment.

## 2023-05-02 NOTE — ASSESSMENT
[FreeTextEntry1] : ASSESSMENT:\par \par The patient comes in today with chronic exacerbated symptoms of right shoulder pain in the form of impingement tendinopathy and weakness.  He has tried activity modification injection as well as physical therapy and symptoms continue to persist.  At this point I do recommend arthroscopic management.  We have discussed the results of his MRI showing AC joint arthropathy and tendinopathy and potential labral pathology.\par [I have diagnosed the patient today with a new diagnosis - This may diminish bodily function for the extremity.] \par \par [For this I was able to review other physician’s note(s) including reviewing other tests, imaging results as well as personally view these results for my own interpretation.]\par \par The patient was adequately and thoroughly informed of my assessment of their current condition(s). \par \par Consent for [Right] Shoulder Arthroscopy\par \par The patient's history, physical exam and any relevant studies were reviewed with the patient at length.  We reviewed treatment options including activity modification techniques, available pharmaceuticals for pain management, physical therapy, and available surgical interventions.  The risks and benefits of all treatments were reviewed, including the potential risk and morbidity of delaying surgery. Therefore, at this point the patient would like to proceed with surgical management for their shoulder condition. \par \par Shoulder arthroscopy consisting of debridement of pathology, debridement and/or repair of rotator cuff injuries, capsular injuries, ligamentous injuries, labral injuries, cartilage degeneration or acute injuries of cartilage and/or biceps tendon pathologies were discussed with the patient.  \par \par Expected postoperative course, limitations, and recovery time were discussed.  In most cases, surgery will result primarily in pain improvement, often to levels noted before the injury.  Some motion and strength gains secondary to reduced pain are also expected, but complete return to preinjury levels in these areas is less common albeit certainly a goal of this treatment modality. We discussed that maximum benefit following surgery may not be attained for up greater than one years.  \par \par Also, the risk of re-tearing is not insignificant, particularly if the tear involves multiple tendons.  Additional risks to the shoulder include: incomplete pain relief, incomplete improvement in strength, stiffness, infection, symptomatic hardware, and rarely need for further surgery.  Although extremely rare, blood clots and related complications do occur.  As with all surgery, complications related to anesthesia and other medical conditions are possible.  The counseling was customized to patient factors including age, activity level, and unique clinical factors.\par \par \par DISCUSSION:\par 1.  He has elected to proceed with right shoulder arthroscopic biceps tenodesis, acromioplasty, potential rotator cuff repair, debridement\par \par \par

## 2023-05-08 ENCOUNTER — APPOINTMENT (OUTPATIENT)
Dept: ORTHOPEDIC SURGERY | Facility: CLINIC | Age: 78
End: 2023-05-08
Payer: MEDICARE

## 2023-05-08 VITALS — DIASTOLIC BLOOD PRESSURE: 75 MMHG | HEART RATE: 51 BPM | SYSTOLIC BLOOD PRESSURE: 153 MMHG

## 2023-05-08 DIAGNOSIS — M25.531 PAIN IN RIGHT WRIST: ICD-10-CM

## 2023-05-08 DIAGNOSIS — M25.532 PAIN IN LEFT WRIST: ICD-10-CM

## 2023-05-08 DIAGNOSIS — M19.039 PRIMARY OSTEOARTHRITIS, UNSPECIFIED WRIST: ICD-10-CM

## 2023-05-08 DIAGNOSIS — M19.049 PRIMARY OSTEOARTHRITIS, UNSPECIFIED HAND: ICD-10-CM

## 2023-05-08 PROCEDURE — 73110 X-RAY EXAM OF WRIST: CPT | Mod: 50

## 2023-05-08 PROCEDURE — 20605 DRAIN/INJ JOINT/BURSA W/O US: CPT | Mod: LT

## 2023-05-08 PROCEDURE — 99214 OFFICE O/P EST MOD 30 MIN: CPT | Mod: 25

## 2023-05-08 PROCEDURE — 20600 DRAIN/INJ JOINT/BURSA W/O US: CPT | Mod: 50

## 2023-05-08 PROCEDURE — 20550 NJX 1 TENDON SHEATH/LIGAMENT: CPT | Mod: 50,59

## 2023-05-08 NOTE — HISTORY OF PRESENT ILLNESS
[FreeTextEntry1] : Josse is here with new separate complaints that have also been ongoing for a few years now and worsening as of recently including bilateral wrist and thumb discomfort.  He has trouble with activities that require pinching .

## 2023-05-08 NOTE — PHYSICAL EXAM
[de-identified] : He is well-appearing on exam\par Examination of the left wrist reveals significant effusion with crepitus upon motion.  \par Flexion is 80 and extension is 70.  There is tenderness at the radiocarpal joint as well with a palpable effusion.  \par There is pain with Louis shift testing. P/S is painless.\par \par Examination at the level of the bilateral wrist reveals tenderness at the level of the first dorsal compartment with a positive finkelstein.\par Examination of the bilateral thumb basal joint reveals pain with compression of the CMC joint with a positive grind test for pain\par \par  [de-identified] : [4] views of [bilateral hands and wrists] were obtained today in my office and were seen by me and discussed with the patient. \par These [show findings consistent with bilateral basal joint OA and findings of IP joint OA]\par There are findings of left-sided slack wrist with degeneration of the radial scaphoid joint

## 2023-05-08 NOTE — ASSESSMENT
[FreeTextEntry1] : ASSESSMENT:\par \par The patient comes in today with multiple chronic exacerbated symptoms including left wrist arthropathy in the form of slack wrist.  He does deny history of gout.  For this I recommend bracing as well as an injection.  For bilateral basal joint arthropathy symptoms and tendinopathy I also do recommend injection therapy and bracing.  We have discussed operative and nonoperative modalities.\par [I have diagnosed the patient today with a new diagnosis - This may diminish bodily function for the extremity.] \par \par [For this I was able to review other physician’s note(s) including reviewing other tests, imaging results as well as personally view these results for my own interpretation.]\par \par Injection:\par \par The risks and benefits of a steroid injection were discussed in detail. The risks include but are not limited to: pain, infection, swelling, flare response, bleeding, subcutaneous fat atrophy, skin depigmentation and/or elevation of blood sugar. The risk of incomplete resolution of symptoms, recurrence and additional intervention was reviewed and considered by the patient. \par The patient agreed to proceed and under a sterile prep, I injected 1 unit into 2 cc of a combination of Celestone and Lidocaine into the left basal joint, left first dorsal compartment, right basal joint, right first dorsal compartment, left wrist joint. The patient tolerated the injection well.\par \par The patient was adequately and thoroughly informed of my assessment of their current condition(s). \par \par DISCUSSION:\par 1.  I am hopeful that the injections to bilateral basal joints and bilateral adjacent first dorsal compartments as well as the left wrist will help relieve all his symptoms.  He will brace as needed as well\par 2.  We will see each other again in 10 weeks time to reassess\par \par

## 2023-05-19 ENCOUNTER — OUTPATIENT (OUTPATIENT)
Dept: OUTPATIENT SERVICES | Facility: HOSPITAL | Age: 78
LOS: 1 days | End: 2023-05-19
Payer: COMMERCIAL

## 2023-05-19 DIAGNOSIS — Z98.890 OTHER SPECIFIED POSTPROCEDURAL STATES: Chronic | ICD-10-CM

## 2023-05-19 DIAGNOSIS — Z98.49 CATARACT EXTRACTION STATUS, UNSPECIFIED EYE: Chronic | ICD-10-CM

## 2023-05-19 DIAGNOSIS — Z98.89 OTHER SPECIFIED POSTPROCEDURAL STATES: Chronic | ICD-10-CM

## 2023-05-19 DIAGNOSIS — Z01.818 ENCOUNTER FOR OTHER PREPROCEDURAL EXAMINATION: ICD-10-CM

## 2023-05-19 LAB
A1C WITH ESTIMATED AVERAGE GLUCOSE RESULT: 5.1 % — SIGNIFICANT CHANGE UP (ref 4–5.6)
ANION GAP SERPL CALC-SCNC: 10 MMOL/L — SIGNIFICANT CHANGE UP (ref 5–17)
APTT BLD: 29.3 SEC — SIGNIFICANT CHANGE UP (ref 27.5–35.5)
BASOPHILS # BLD AUTO: 0.03 K/UL — SIGNIFICANT CHANGE UP (ref 0–0.2)
BASOPHILS NFR BLD AUTO: 0.5 % — SIGNIFICANT CHANGE UP (ref 0–2)
BUN SERPL-MCNC: 14.6 MG/DL — SIGNIFICANT CHANGE UP (ref 8–20)
CALCIUM SERPL-MCNC: 8.8 MG/DL — SIGNIFICANT CHANGE UP (ref 8.4–10.5)
CHLORIDE SERPL-SCNC: 102 MMOL/L — SIGNIFICANT CHANGE UP (ref 96–108)
CO2 SERPL-SCNC: 27 MMOL/L — SIGNIFICANT CHANGE UP (ref 22–29)
CREAT SERPL-MCNC: 0.9 MG/DL — SIGNIFICANT CHANGE UP (ref 0.5–1.3)
EGFR: 88 ML/MIN/1.73M2 — SIGNIFICANT CHANGE UP
EOSINOPHIL # BLD AUTO: 0.11 K/UL — SIGNIFICANT CHANGE UP (ref 0–0.5)
EOSINOPHIL NFR BLD AUTO: 1.8 % — SIGNIFICANT CHANGE UP (ref 0–6)
ESTIMATED AVERAGE GLUCOSE: 100 MG/DL — SIGNIFICANT CHANGE UP (ref 68–114)
GLUCOSE SERPL-MCNC: 91 MG/DL — SIGNIFICANT CHANGE UP (ref 70–99)
HCT VFR BLD CALC: 41.7 % — SIGNIFICANT CHANGE UP (ref 39–50)
HGB BLD-MCNC: 14.2 G/DL — SIGNIFICANT CHANGE UP (ref 13–17)
IMM GRANULOCYTES NFR BLD AUTO: 0.8 % — SIGNIFICANT CHANGE UP (ref 0–0.9)
INR BLD: 0.97 RATIO — SIGNIFICANT CHANGE UP (ref 0.88–1.16)
LYMPHOCYTES # BLD AUTO: 1.82 K/UL — SIGNIFICANT CHANGE UP (ref 1–3.3)
LYMPHOCYTES # BLD AUTO: 29 % — SIGNIFICANT CHANGE UP (ref 13–44)
MCHC RBC-ENTMCNC: 32.8 PG — SIGNIFICANT CHANGE UP (ref 27–34)
MCHC RBC-ENTMCNC: 34.1 GM/DL — SIGNIFICANT CHANGE UP (ref 32–36)
MCV RBC AUTO: 96.3 FL — SIGNIFICANT CHANGE UP (ref 80–100)
MONOCYTES # BLD AUTO: 0.6 K/UL — SIGNIFICANT CHANGE UP (ref 0–0.9)
MONOCYTES NFR BLD AUTO: 9.6 % — SIGNIFICANT CHANGE UP (ref 2–14)
NEUTROPHILS # BLD AUTO: 3.67 K/UL — SIGNIFICANT CHANGE UP (ref 1.8–7.4)
NEUTROPHILS NFR BLD AUTO: 58.3 % — SIGNIFICANT CHANGE UP (ref 43–77)
PLATELET # BLD AUTO: 145 K/UL — LOW (ref 150–400)
POTASSIUM SERPL-MCNC: 4.4 MMOL/L — SIGNIFICANT CHANGE UP (ref 3.5–5.3)
POTASSIUM SERPL-SCNC: 4.4 MMOL/L — SIGNIFICANT CHANGE UP (ref 3.5–5.3)
PROTHROM AB SERPL-ACNC: 11.2 SEC — SIGNIFICANT CHANGE UP (ref 10.5–13.4)
RBC # BLD: 4.33 M/UL — SIGNIFICANT CHANGE UP (ref 4.2–5.8)
RBC # FLD: 12 % — SIGNIFICANT CHANGE UP (ref 10.3–14.5)
SODIUM SERPL-SCNC: 139 MMOL/L — SIGNIFICANT CHANGE UP (ref 135–145)
WBC # BLD: 6.28 K/UL — SIGNIFICANT CHANGE UP (ref 3.8–10.5)
WBC # FLD AUTO: 6.28 K/UL — SIGNIFICANT CHANGE UP (ref 3.8–10.5)

## 2023-05-19 PROCEDURE — 83036 HEMOGLOBIN GLYCOSYLATED A1C: CPT

## 2023-05-19 PROCEDURE — 93005 ELECTROCARDIOGRAM TRACING: CPT

## 2023-05-19 PROCEDURE — 36415 COLL VENOUS BLD VENIPUNCTURE: CPT

## 2023-05-19 PROCEDURE — 85730 THROMBOPLASTIN TIME PARTIAL: CPT

## 2023-05-19 PROCEDURE — 93010 ELECTROCARDIOGRAM REPORT: CPT

## 2023-05-19 PROCEDURE — 80048 BASIC METABOLIC PNL TOTAL CA: CPT

## 2023-05-19 PROCEDURE — 85610 PROTHROMBIN TIME: CPT

## 2023-05-19 PROCEDURE — 85025 COMPLETE CBC W/AUTO DIFF WBC: CPT

## 2023-05-19 PROCEDURE — G0463: CPT

## 2023-05-25 ENCOUNTER — APPOINTMENT (OUTPATIENT)
Dept: PULMONOLOGY | Facility: CLINIC | Age: 78
End: 2023-05-25
Payer: MEDICARE

## 2023-05-25 VITALS
OXYGEN SATURATION: 98 % | SYSTOLIC BLOOD PRESSURE: 136 MMHG | RESPIRATION RATE: 16 BRPM | DIASTOLIC BLOOD PRESSURE: 74 MMHG | HEART RATE: 53 BPM

## 2023-05-25 VITALS — HEIGHT: 67 IN | WEIGHT: 162 LBS | BODY MASS INDEX: 25.43 KG/M2

## 2023-05-25 DIAGNOSIS — Z01.811 ENCOUNTER FOR PREPROCEDURAL RESPIRATORY EXAMINATION: ICD-10-CM

## 2023-05-25 PROCEDURE — 99214 OFFICE O/P EST MOD 30 MIN: CPT | Mod: 25

## 2023-05-25 PROCEDURE — 94010 BREATHING CAPACITY TEST: CPT

## 2023-05-25 RX ORDER — OMEPRAZOLE 20 MG/1
20 CAPSULE, DELAYED RELEASE ORAL
Refills: 0 | Status: ACTIVE | COMMUNITY

## 2023-05-25 RX ORDER — ALBUTEROL SULFATE 90 UG/1
108 (90 BASE) INHALANT RESPIRATORY (INHALATION)
Qty: 1 | Refills: 5 | Status: DISCONTINUED | COMMUNITY
Start: 2021-08-19 | End: 2023-05-25

## 2023-05-25 NOTE — DISCUSSION/SUMMARY
[FreeTextEntry1] : Asthma , mild intermittent/persistent,\par never smoker, no pulmonary complaints, exercises regularly\par lung exam is normal, normal sp02\par CXR 3/22 negative\par has ED, compliant with CPAP, ( 8cm water per pt ) \par For shoulder surgery\par No pulmonary contraindications, mild  increased risk of post operative pulmonary complications\par risk/benefit favors\par start breo 100 daily and AM of procedure, albuterol neb q4-6 hrs prn\par Pt will bring his cpap to use post anesthesia and q Hs\par incentive spirometry, DVT prophylaxis, monitored bed\par follow up 6 months or sooner if needed\par \par \par

## 2023-05-25 NOTE — CONSULT LETTER
[Dear  ___] : Dear  [unfilled], [Consult Letter:] : I had the pleasure of evaluating your patient, [unfilled]. [Please see my note below.] : Please see my note below. [Sincerely,] : Sincerely, [DrErica  ___] : Dr. ALFORD [FreeTextEntry3] : Crow Wagner DO [facility]\par Pulmonary Critical Care\par Director Pulmonary Division\par Medical Director Respiratory Therapy\par [facility]\par \par

## 2023-05-25 NOTE — PROCEDURE
[FreeTextEntry1] : CXR 3/22\par Spirometry today- mild air flow obstruction, no sig change from 3/22

## 2023-05-25 NOTE — HISTORY OF PRESENT ILLNESS
[Never] : never [TextBox_4] : never smoker\par doing well, no fever, chill, \par no dyspnea\par has ED on cpap, now mild- cpap 8\par Cardiology Dr Poon [facility]\par  saw vascular, no DVT\par has atypical  CP x yrs, Cardiology work up negative\par used albuterol prn, states it doesn’t help\par \par 5/24/24\par doing well\par no dyspnea\par no fever, chill, chest pain\par for R shoulder surgery

## 2023-05-30 DIAGNOSIS — M25.511 PAIN IN RIGHT SHOULDER: ICD-10-CM

## 2023-05-31 ENCOUNTER — APPOINTMENT (OUTPATIENT)
Dept: ORTHOPEDIC SURGERY | Facility: AMBULATORY SURGERY CENTER | Age: 78
End: 2023-05-31
Payer: MEDICARE

## 2023-05-31 PROCEDURE — 29828 SHO ARTHRS SRG BICP TENODSIS: CPT | Mod: RT

## 2023-05-31 PROCEDURE — 29826 SHO ARTHRS SRG DECOMPRESSION: CPT | Mod: RT

## 2023-05-31 PROCEDURE — 29823 SHO ARTHRS SRG XTNSV DBRDMT: CPT | Mod: RT,59

## 2023-05-31 PROCEDURE — 29827 SHO ARTHRS SRG RT8TR CUF RPR: CPT | Mod: RT

## 2023-06-14 ENCOUNTER — APPOINTMENT (OUTPATIENT)
Dept: ORTHOPEDIC SURGERY | Facility: CLINIC | Age: 78
End: 2023-06-14
Payer: MEDICARE

## 2023-06-14 PROCEDURE — 99024 POSTOP FOLLOW-UP VISIT: CPT

## 2023-06-14 NOTE — HISTORY OF PRESENT ILLNESS
[de-identified] : s/p right shoulder rotator cuff repair. Right shoulder arthroscopic biceps tenodesis. Arthroscopic acromioplasty and extensive debridement. 5/31/23 [de-identified] : Josse is here for postoperative follow-up.  He describes no discomfort.  He has been wearing his sling [de-identified] : He is well-appearing on exam.  Examination of the right shoulder reveals excellent shoulder and biceps contour.  Incision sites have all healed excellently without any signs of infection.\par He is neurovascularly intact with intact finger and wrist motion [de-identified] : We are both delighted with the results.  At this point in time I do recommend physical therapy to help optimize his outcome and avoid stiffness [de-identified] : 1.  He will commence PT, he will taper off the sling after the fourth week.\par #2 we will see each other again in 4 weeks time

## 2023-07-18 ENCOUNTER — APPOINTMENT (OUTPATIENT)
Dept: ORTHOPEDIC SURGERY | Facility: CLINIC | Age: 78
End: 2023-07-18
Payer: MEDICARE

## 2023-07-18 PROCEDURE — 99024 POSTOP FOLLOW-UP VISIT: CPT

## 2023-07-18 NOTE — HISTORY OF PRESENT ILLNESS
[de-identified] : s/p right shoulder rotator cuff repair. Right shoulder arthroscopic biceps tenodesis. Arthroscopic acromioplasty and extensive debridement. 5/31/23 [de-identified] : Josse is here for postoperative follow-up.  He describes no discomfort.  He has been progressing well with physical therapy he states.  He has begun with strengthening modalities [de-identified] : He is well-appearing on exam. \par Both active and passive motion is equal he is able to achieve forward flexion/abduction of about 140.  External rotation is 45 bilateral.. He reaches behind his head comfortably  [de-identified] : We are both delighted with the results.  At this point in time I do recommend continuing with physical therapy to help optimize his outcome [de-identified] : 1.  He will continue with PT and HEP and we will see each other again in 8 weeks

## 2023-09-11 NOTE — ASU PATIENT PROFILE, ADULT - MEDICATION HERBAL REMEDIES, PROFILE
no
acetaminophen 325 mg oral tablet: 2 tab(s) orally every 6 hours as needed for Moderate Pain (4 - 6)  ALPRAZolam 1 mg oral tablet: 1 tab(s) orally 3 times a day  apixaban 5 mg oral tablet: 1 tab(s) orally 2 times a day  baclofen 10 mg oral tablet: 1 orally 3 times a day  cefpodoxime 200 mg oral tablet: 1 tab(s) orally 2 times a day  cloNIDine 0.2 mg/24 hr transdermal film, extended release: 1 film(s) transdermally once a week  DULoxetine 30 mg oral delayed release capsule: 1 orally 3 times a day  fenofibrate 160 mg oral tablet: 1 tab(s) orally once a day  furosemide 20 mg oral tablet: 1 orally 2 times a day  guaiFENesin 600 mg oral tablet, extended release: 1 tab(s) orally every 12 hours as needed for Cough  losartan 100 mg oral tablet: 1 tab(s) orally once a day  metFORMIN 500 mg oral tablet: 1 tab(s) orally 2 times a day  morphine 60 mg/8 to 12 hr oral tablet, extended release: 1 tab(s) orally 2 times a day  naloxone 8 mg/0.1 mL nasal spray: 8 milligram(s) intranasally once a day  oxyBUTYnin 10 mg/24 hr oral tablet, extended release: 1 orally once a day  pantoprazole 40 mg oral delayed release tablet: 1 tab(s) orally once a day  senna leaf extract oral tablet: 2 tab(s) orally once a day (at bedtime)  simvastatin 20 mg oral tablet: 1 tab(s) orally once a day (at bedtime)  tamsulosin 0.4 mg oral capsule: 1 cap(s) orally once a day  tiotropium 2.5 mcg/inh inhalation aerosol: 2 puff(s) inhaled once a day  Trelegy Ellipta 100 mcg-62.5 mcg-25 mcg/inh inhalation powder: 1 puff(s) inhaled once a day  Ventolin HFA 90 mcg/inh inhalation aerosol: 2 puff(s) inhaled 2 times a day  Wellbutrin  mg/12 hours oral tablet, extended release: 1 orally once a day

## 2023-09-19 ENCOUNTER — APPOINTMENT (OUTPATIENT)
Dept: ORTHOPEDIC SURGERY | Facility: CLINIC | Age: 78
End: 2023-09-19
Payer: MEDICARE

## 2023-09-19 VITALS
HEART RATE: 60 BPM | DIASTOLIC BLOOD PRESSURE: 78 MMHG | SYSTOLIC BLOOD PRESSURE: 168 MMHG | HEIGHT: 67 IN | WEIGHT: 170 LBS | TEMPERATURE: 97.8 F | BODY MASS INDEX: 26.68 KG/M2

## 2023-09-19 DIAGNOSIS — M75.50 BURSITIS OF UNSPECIFIED SHOULDER: ICD-10-CM

## 2023-09-19 DIAGNOSIS — M75.90 BURSITIS OF UNSPECIFIED SHOULDER: ICD-10-CM

## 2023-09-19 PROCEDURE — 99213 OFFICE O/P EST LOW 20 MIN: CPT

## 2023-12-06 ENCOUNTER — APPOINTMENT (OUTPATIENT)
Dept: PULMONOLOGY | Facility: CLINIC | Age: 78
End: 2023-12-06
Payer: MEDICARE

## 2023-12-06 VITALS
RESPIRATION RATE: 16 BRPM | OXYGEN SATURATION: 96 % | DIASTOLIC BLOOD PRESSURE: 80 MMHG | SYSTOLIC BLOOD PRESSURE: 140 MMHG | HEART RATE: 102 BPM

## 2023-12-06 VITALS — HEIGHT: 67 IN | BODY MASS INDEX: 26.68 KG/M2 | WEIGHT: 170 LBS

## 2023-12-06 PROCEDURE — 99214 OFFICE O/P EST MOD 30 MIN: CPT

## 2023-12-06 RX ORDER — OXYCODONE AND ACETAMINOPHEN 5; 325 MG/1; MG/1
5-325 TABLET ORAL
Qty: 30 | Refills: 0 | Status: DISCONTINUED | COMMUNITY
Start: 2023-05-31 | End: 2023-12-06

## 2023-12-08 ENCOUNTER — APPOINTMENT (OUTPATIENT)
Dept: ORTHOPEDIC SURGERY | Facility: CLINIC | Age: 78
End: 2023-12-08
Payer: MEDICARE

## 2023-12-08 VITALS
BODY MASS INDEX: 26.68 KG/M2 | SYSTOLIC BLOOD PRESSURE: 163 MMHG | HEART RATE: 59 BPM | WEIGHT: 170 LBS | HEIGHT: 67 IN | DIASTOLIC BLOOD PRESSURE: 81 MMHG

## 2023-12-08 DIAGNOSIS — M25.511 PAIN IN RIGHT SHOULDER: ICD-10-CM

## 2023-12-08 PROCEDURE — 20600 DRAIN/INJ JOINT/BURSA W/O US: CPT | Mod: 50

## 2023-12-08 PROCEDURE — 99214 OFFICE O/P EST MOD 30 MIN: CPT | Mod: 25

## 2024-01-03 ENCOUNTER — APPOINTMENT (OUTPATIENT)
Dept: PULMONOLOGY | Facility: CLINIC | Age: 79
End: 2024-01-03
Payer: MEDICARE

## 2024-01-03 VITALS
SYSTOLIC BLOOD PRESSURE: 140 MMHG | RESPIRATION RATE: 16 BRPM | BODY MASS INDEX: 26.68 KG/M2 | HEART RATE: 69 BPM | WEIGHT: 170 LBS | OXYGEN SATURATION: 97 % | HEIGHT: 67 IN | DIASTOLIC BLOOD PRESSURE: 75 MMHG

## 2024-01-03 DIAGNOSIS — Z86.79 PERSONAL HISTORY OF OTHER DISEASES OF THE CIRCULATORY SYSTEM: ICD-10-CM

## 2024-01-03 DIAGNOSIS — G47.30 HYPERSOMNIA, UNSPECIFIED: ICD-10-CM

## 2024-01-03 DIAGNOSIS — G47.10 HYPERSOMNIA, UNSPECIFIED: ICD-10-CM

## 2024-01-03 PROCEDURE — 99214 OFFICE O/P EST MOD 30 MIN: CPT

## 2024-01-03 NOTE — PLAN
[TextEntry] : Have requested data from CPAP machine. In the meantime, ordering him a new mask; try AirFit F30i and see if this helps with snoring, dry mouth. Will see based on response to new mask and based on data from machine if we need any new testing. F/U with Dr Wagner.

## 2024-01-03 NOTE — REVIEW OF SYSTEMS
[Negative] : Musculoskeletal [FreeTextEntry3] : per HPI [FreeTextEntry8] : per HPI [FreeTextEntry9] : per HPI [de-identified] : per HPI

## 2024-01-03 NOTE — HISTORY OF PRESENT ILLNESS
[FreeTextEntry1] : Hx ED.  Dx with ED early 2000s; no treatment for a few years. Started CPAP about about 2009. Been on it since. On CPAP 8. Using an under the nose mask; does not know which one.  Uses it every night.   Thinks his last sleep study was about 4 y/a at [facility] in [place]. Told mod to severe.  Snoring with it on when his mouth opens.  Wake up dry mouth. Interested in FFM. Also with increasing EDS.   Lost significant weight since last sleep study.   Hx asthma. Sees Dr Wagner.  Hx HTN.    Never smoked.

## 2024-02-20 ENCOUNTER — APPOINTMENT (OUTPATIENT)
Dept: PULMONOLOGY | Facility: CLINIC | Age: 79
End: 2024-02-20
Payer: MEDICARE

## 2024-02-20 VITALS
DIASTOLIC BLOOD PRESSURE: 82 MMHG | RESPIRATION RATE: 16 BRPM | SYSTOLIC BLOOD PRESSURE: 134 MMHG | HEART RATE: 65 BPM | OXYGEN SATURATION: 97 %

## 2024-02-20 DIAGNOSIS — G47.33 OBSTRUCTIVE SLEEP APNEA (ADULT) (PEDIATRIC): ICD-10-CM

## 2024-02-20 PROCEDURE — 99214 OFFICE O/P EST MOD 30 MIN: CPT

## 2024-02-20 PROCEDURE — G2211 COMPLEX E/M VISIT ADD ON: CPT

## 2024-02-20 NOTE — ASSESSMENT
[FreeTextEntry1] : The patient is using CPAP well, is compliant with it's use and getting clinical benefit. The patient should continue to use CPAP.

## 2024-02-20 NOTE — PHYSICAL EXAM
[General Appearance - In No Acute Distress] : no acute distress [Low Lying Soft Palate] : low lying soft palate [Elongated Uvula] : elongated uvula [Enlarged Base of the Tongue] : enlargement of the base of the tongue [III] : III [Heart Rate And Rhythm] : heart rate was normal and rhythm regular [] : no respiratory distress [Respiration, Rhythm And Depth] : normal respiratory rhythm and effort [Exaggerated Use Of Accessory Muscles For Inspiration] : no accessory muscle use [Auscultation Breath Sounds / Voice Sounds] : lungs were clear to auscultation bilaterally [Skin Color & Pigmentation] : normal skin color and pigmentation [Oriented To Time, Place, And Person] : oriented to person, place, and time [Impaired Insight] : insight and judgment were intact [Affect] : the affect was normal [Normal Oropharynx] : abnormal oropharynx

## 2024-02-20 NOTE — PLAN
[TextEntry] : Will see if we can assist him with new mask. We will  look into a sample in the meantime he can come by and get. Advised increase humidity.  Will see based on response to new mask and based on data from machine if we need any new testing. F/U with Dr Wagner.

## 2024-02-20 NOTE — HISTORY OF PRESENT ILLNESS
[FreeTextEntry1] : Hx ED.  Dx with ED early 2000s; no treatment for a few years. Started CPAP about about 2009. Been on it since. On CPAP 8. Using an under the nose mask; does not know which one.  Uses it every night.   Thinks his last sleep study was about 4 y/a at [facility] in [place]. Told mod to severe.  On last visit here, c/o snoring on CPAP when his mouth opens.  Wake up dry mouth. Was interested in FFM. Had some EDS. Order sent for FFM 1/4/24. He never got it.   Keeps humidity on machine low.   Lost significant weight since last sleep study.   Hx asthma. Sees Dr Wagner.  Hx HTN.    Never smoked.       [CPAP: ___ cmH2O] : CPAP: [unfilled] cmH2O [% Days used > 4 hrs: ____] : Days used > 4 hrs: [unfilled] % [Therapy based AHI: ___ /hr] : Therapy based AHI: [unfilled] / hr

## 2024-02-20 NOTE — END OF VISIT
[FreeTextEntry3] : reviewing compliance, mask choices [Time Spent: ___ minutes] : I have spent [unfilled] minutes of time on the encounter.

## 2024-02-28 ENCOUNTER — APPOINTMENT (OUTPATIENT)
Dept: ORTHOPEDIC SURGERY | Facility: CLINIC | Age: 79
End: 2024-02-28
Payer: MEDICARE

## 2024-02-28 PROCEDURE — 99214 OFFICE O/P EST MOD 30 MIN: CPT | Mod: 25

## 2024-02-28 PROCEDURE — 20600 DRAIN/INJ JOINT/BURSA W/O US: CPT | Mod: 59,F5

## 2024-02-28 PROCEDURE — 20550 NJX 1 TENDON SHEATH/LIGAMENT: CPT | Mod: 50

## 2024-02-28 NOTE — ASSESSMENT
[FreeTextEntry1] : ASSESSMENT: [The patient was accompanied today and was assisted with explaining their complaints today.] The patient comes in today with chronic exacerbated bilateral wrist and thumb discomfort.  The symptoms are affecting his activities of daily living as well as activities that involve pinch and .  His symptoms are consistent with bilateral basal joint arthropathy, bilateral de Quervain's tenosynovitis, right trigger thumb.  Treatment modalities were discussed, and patient elects for repeat injections.   The patient was adequately and thoroughly informed of my assessment of their current condition(s).  - This may diminish bodily function for the extremity.  We discussed prognosis, treatment modalities including operative and nonoperative options for the above diagnostic assessment. As always, 2nd opinion is always provided as an option. For this, when accessible, I was able to review other physicians note(s) including reviewing other tests, imaging results as well as personally view these results for my own interpretation.      Injection:   The risks and benefits of a steroid injection were discussed in detail. The risks include but are not limited to: pain, infection, swelling, flare response, bleeding, subcutaneous fat atrophy, skin depigmentation and/or elevation of blood sugar. The risk of incomplete resolution of symptoms, recurrence and additional intervention was reviewed and considered by the patient.  The patient agreed to proceed and under a sterile prep, I injected 1 unit (6mg) into 1 cc of a combination of Celestone and Lidocaine into the [bilateral basal joint, bilateral de Quervain's, right trigger thumb]. The patient tolerated the injection well.   The patient was adequately and thoroughly informed of my assessment of their current condition(s).    DISCUSSION: 1.  Injections as above.  Activity modifications.  Bracing as needed.  Follow-up in 10 weeks. 2. [x] 3. [x]

## 2024-02-28 NOTE — HISTORY OF PRESENT ILLNESS
[FreeTextEntry1] : Josse is a pleasant 78-year-old male who presents today with chronic exacerbated bilateral wrist and thumb discomfort.  The symptoms are affecting his activities of daily living as well as activities that involve pinch and .

## 2024-02-28 NOTE — PHYSICAL EXAM
[de-identified] : Examination at the level of the [bilateral] wrist reveals tenderness at the level of the first dorsal compartment with a positive finkelstein.   Examination of the [bilateral] thumb basal joint reveals pain with compression of the CMC joint with a positive grind test for pain. Examination of the hand(s) particularly at the A1 of the [right trigger thumb] reveals tenderness with a palpable click. [de-identified] : [4] views of [bilateral hands and wrists] were reviewed today in my office and were seen by me and discussed with the patient.  These [show findings consistent with bilateral basal joint OA and findings of IP joint OA]

## 2024-03-04 ENCOUNTER — APPOINTMENT (OUTPATIENT)
Dept: ORTHOPEDIC SURGERY | Facility: CLINIC | Age: 79
End: 2024-03-04
Payer: MEDICARE

## 2024-03-04 VITALS
DIASTOLIC BLOOD PRESSURE: 81 MMHG | HEART RATE: 65 BPM | SYSTOLIC BLOOD PRESSURE: 169 MMHG | BODY MASS INDEX: 26.68 KG/M2 | WEIGHT: 170 LBS | HEIGHT: 67 IN

## 2024-03-04 DIAGNOSIS — M25.551 PAIN IN RIGHT HIP: ICD-10-CM

## 2024-03-04 DIAGNOSIS — M25.552 PAIN IN RIGHT HIP: ICD-10-CM

## 2024-03-04 PROCEDURE — 99213 OFFICE O/P EST LOW 20 MIN: CPT

## 2024-03-04 PROCEDURE — 73523 X-RAY EXAM HIPS BI 5/> VIEWS: CPT

## 2024-03-04 NOTE — HISTORY OF PRESENT ILLNESS
[Pain Location] : pain [] : right hip [Worsening] : worsening [Standing] : standing [Constant] : ~He/She~ states the symptoms seem to be constant [Bending] : worsened by bending [Hip Movement] : worsened by hip movement [Sitting] : worsened by sitting [Running] : worsened by running [Walking] : worsened by walking [de-identified] : 78 year old male presents today for an initial consultation for his right hip pain. The patient was referred to me by her Hand & Wrist Orthopedist, Dr. Phoenix Benitez.  Patient states that he has a history of prostate cancer that underwent surgery back in 2020.  He states that he began to experience increasing perineal pain recently.  He underwent an MRI and was referred due to the findings of synovial cyst.  He denies any groin pain bilaterally.  He denies any history of trauma to the hips.  Denies any issues with putting on socks and shoes going up and down stairs or rising to seated position. [de-identified] : putting on socks and shoes, getting in and out of the car

## 2024-03-04 NOTE — DISCUSSION/SUMMARY
[Medication Risks Reviewed] : Medication risks reviewed [de-identified] : Patient is a 70-year-old male here today for evaluation of perineal pain that began a few months ago.  He does have a history of perineal pain chronically since his surgery.  I have reassured him that based on his MRI and his x-ray I do not think the pain he is experiencing is coming from his hips.  I recommend he continue with low impact activity and exercise.  She take Tylenol as needed for the pain.  He is going to follow-up with the spine service for his low back pain.  All questions were asked and answered

## 2024-03-04 NOTE — REVIEW OF SYSTEMS
[Joint Pain] : joint pain [Joint Stiffness] : joint stiffness [Negative] : Heme/Lymph [FreeTextEntry9] : right hip pain

## 2024-03-04 NOTE — ADDENDUM
[FreeTextEntry1] : This note was written by Roseann Banuelos, acting as the  for Dr. Griffith. This note accurately reflects the work and decisions made by Dr. Griffith.

## 2024-03-12 ENCOUNTER — APPOINTMENT (OUTPATIENT)
Dept: ORTHOPEDIC SURGERY | Facility: CLINIC | Age: 79
End: 2024-03-12
Payer: MEDICARE

## 2024-03-12 VITALS
BODY MASS INDEX: 24.34 KG/M2 | HEART RATE: 60 BPM | SYSTOLIC BLOOD PRESSURE: 161 MMHG | WEIGHT: 170 LBS | HEIGHT: 70 IN | DIASTOLIC BLOOD PRESSURE: 81 MMHG

## 2024-03-12 PROCEDURE — 99214 OFFICE O/P EST MOD 30 MIN: CPT

## 2024-03-12 PROCEDURE — 99204 OFFICE O/P NEW MOD 45 MIN: CPT

## 2024-03-12 PROCEDURE — 72110 X-RAY EXAM L-2 SPINE 4/>VWS: CPT

## 2024-03-12 NOTE — ASSESSMENT
[FreeTextEntry1] : 78-year-old male with lumbar spondylosis  The patient and I had an extensive discussion today regarding his concerns.  At present, I am not recommending a surgical intervention.  We discussed non-surgical options that may be available to the patient.  Conservative treatment was discussed with the patient at length. Anticipatory guidance regarding disease process, avoidance of acute exacerbation this was discussed at length and all patients commenting concerns were answered to the patient's satisfaction. Physical therapy for decrease pain and increase function was ordered. Intermittent use of acetaminophen 500 mg 2 tablets t.i.d. p.r.n. mild to moderate pain. Home exercise including stretching on a daily basis for 20-30 minutes was recommended. Heat, ice, topical were discussed as needed.   The patient and I discussed the use of non-steroidal anti-inflammatory drugs (NSAIDs) today.  The patient understands that there are both over the counter (OTC) and prescribed medications in this drug class that may be used in the treatment of spinal conditions.  The benefits of this class of medications may include: diminished pain, increased function, and a diminished use of other classes of medications.  Use of medications in this class may also have risks.  These risks include, but are not limited to: kidney damage, gastro-intestinal/stomach issues, cardiovascular issues, reactions to the medication (allergy/intolerance), bleeding concerns, interactions with other medication, and other issues.  Today, I have prescribed meloxicam 15 mg. The patient was counseled to contact us if concerns arise as the result of this medication.  The patient was also counseled to stop the medication if these concerns arise.

## 2024-03-12 NOTE — HISTORY OF PRESENT ILLNESS
[de-identified] : Chief Complaint: Low back pain   History of Present Illness: 78-year-old male presenting today for initial evaluation for his low back pain.  He states that he has had low back pain on and off for years isolated to the right-sided lumbosacral spine aching in nature.  He has had a history of sciatica down his right leg which she describes as radiating into his right gluteal region and right groin but those are infrequent now.  His pain is mainly in the low back and at times across the midline and bilateral paraspinal musculature.  He rates it as a 4 out of 10 in  severity, it is stiff when he wakes up in the morning also worse with increased activity.  He states that it is not prevent him from doing anything but he would like to prevent it from worsening in the future.  He does not take any medications for pain control because he does not like taking medications.  He denies any weakness in his legs he denies any significant numbness or tingling..   Past medical history, past surgical history, medications, allergies, social history, and family history are as documented in our records today.  Notable items include: None   Review of Systems: I have reviewed the patient's documented Review of Systems data today, I concur with this documentation.

## 2024-03-12 NOTE — PHYSICAL EXAM
[de-identified] : CONSTITUTIONAL: Patient is a very pleasant individual who is well-nourished and appears stated age. PSYCHIATRIC: Alert and oriented times three and in no apparent distress, and participates with orthopedic evaluation well. HEAD: Atraumatic and nonsyndromic in appearance. EENT: No thyromegaly, EOMI. RESPIRATORY: Respiratory rate is regular, not dyspneic on examination. LYMPHATICS: There is no cervical or axillary lymphadenopathy. INTEGUMENTARY: Skin is clean, dry, and intact to bilateral lower extremities. VASCULAR: There is brisk capillary refill about the bilateral Lower Extremities with 2+ DP Pulse  Palpation: Tenderness along right sided lumbosacral spine PSIS There is pain with flexion abduction internal rotation right hip recreating right-sided low back pain There is positive Rob sign recreating right-sided  Muscle Strength Testing: Hip Flexion: 5/5 B/L Knee Extension: 5/5 B/L Knee Flexion: 5/5 B/L Dorsiflexion: 5/5 B/L EHL: 5/5 B/L Plantarflexion: 5/5 B/L  Sensation: SILT L2-S1 B/L except: None  Reflexes: 2+ Quadriceps/Achilles  Gait: Normal gait w/o assistance Able to perform tandem gait Able to Heel Walk Able to Toe Walk  Special Testing:  Negative SLR BLLE Negative clonus BLLE  [de-identified] : Standing AP, lateral, flexion and extension radiographs of the lumbar spine performed on 3/12/2024 in the Radiology Department at [facility] for the indication of low back pain are reviewed.  These studies demonstrate there is significant spondylosis and facet arthropathy on AP view mild right hip osteoarthritis Lateral radiograph demonstrates well-maintained lumbar lordosis but there is multilevel disc height loss most severe at L1 to and L4-5 and L5-S1. Signs of transitional anatomy possible sacralized L5  Severe facet arthropathy from L1 S1

## 2024-03-12 NOTE — DISCUSSION/SUMMARY
[de-identified] : Physical therapy 2-3 times per week for 6 to 8 weeks a prescription was provided today in the office Meloxicam 15 mg as needed for pain control All modalities such as stretching, heat, ice I will see him back in 6 weeks
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
## 2024-05-07 ENCOUNTER — APPOINTMENT (OUTPATIENT)
Dept: ORTHOPEDIC SURGERY | Facility: CLINIC | Age: 79
End: 2024-05-07
Payer: MEDICARE

## 2024-05-07 VITALS
WEIGHT: 170 LBS | DIASTOLIC BLOOD PRESSURE: 86 MMHG | SYSTOLIC BLOOD PRESSURE: 168 MMHG | HEART RATE: 58 BPM | BODY MASS INDEX: 24.34 KG/M2 | HEIGHT: 70 IN

## 2024-05-07 PROCEDURE — 99214 OFFICE O/P EST MOD 30 MIN: CPT

## 2024-05-07 NOTE — HISTORY OF PRESENT ILLNESS
[de-identified] : Chief Complaint: Low back pain   History of Present Illness: 78-year-old male presenting today for 6-week follow-up visit.  I initially saw Josse he was diagnosed with lumbar spondylosis he was having significant low back pain mainly right-sided and we have tried a course of physical therapy home exercises and meloxicam as well as some activity modifications he states that he is unable to take the meloxicam because it irritated his stomach he cannot go to physical therapy because of time constraints but he was doing some home exercises with no significant relief of his symptoms.  He states that overall symptoms are still present significantly causing him issues with bending twisting lifting he states the pain is constant and she would like to consider other options moving forward for better pain management   Past medical history, past surgical history, medications, allergies, social history, and family history are as documented in our records today.  Notable items include: None   Review of Systems: I have reviewed the patient's documented Review of Systems data today, I concur with this documentation.

## 2024-05-07 NOTE — DISCUSSION/SUMMARY
[de-identified] : MRI lumbar spine to evaluate the extent of lumbar spondylosis this will also allow us to move forward with lumbar injections as he is unable to take NSAIDs because of gastritis I will call him back after the MRI to go over the results and likely refer to our PM&R colleagues for injections

## 2024-05-08 ENCOUNTER — APPOINTMENT (OUTPATIENT)
Dept: ORTHOPEDIC SURGERY | Facility: CLINIC | Age: 79
End: 2024-05-08
Payer: MEDICARE

## 2024-05-08 DIAGNOSIS — M25.531 PAIN IN RIGHT WRIST: ICD-10-CM

## 2024-05-08 DIAGNOSIS — M65.4 RADIAL STYLOID TENOSYNOVITIS [DE QUERVAIN]: ICD-10-CM

## 2024-05-08 DIAGNOSIS — M19.049 PRIMARY OSTEOARTHRITIS, UNSPECIFIED HAND: ICD-10-CM

## 2024-05-08 DIAGNOSIS — M65.30 TRIGGER FINGER, UNSPECIFIED FINGER: ICD-10-CM

## 2024-05-08 DIAGNOSIS — M25.532 PAIN IN RIGHT WRIST: ICD-10-CM

## 2024-05-08 PROCEDURE — 99214 OFFICE O/P EST MOD 30 MIN: CPT | Mod: 25

## 2024-05-08 PROCEDURE — 20600 DRAIN/INJ JOINT/BURSA W/O US: CPT | Mod: 50,59

## 2024-05-08 PROCEDURE — 20550 NJX 1 TENDON SHEATH/LIGAMENT: CPT | Mod: 59

## 2024-05-08 NOTE — ASSESSMENT
[FreeTextEntry1] : ASSESSMENT: [The patient was accompanied today and was assisted with explaining their complaints today.] The patient comes in today with chronic exacerbated bilateral wrist and thumb discomfort.  The symptoms are affecting his activities of daily living as well as activities that involve pinch and .  His symptoms are consistent with bilateral basal joint arthropathy, bilateral de Quervain's tenosynovitis, right trigger thumb.  Treatment modalities were discussed, and patient elects for repeat injections.  He is considering surgical management.   The patient was adequately and thoroughly informed of my assessment of their current condition(s).  - This may diminish bodily function for the extremity.  We discussed prognosis, treatment modalities including operative and nonoperative options for the above diagnostic assessment. As always, 2nd opinion is always provided as an option. For this, when accessible, I was able to review other physicians note(s) including reviewing other tests, imaging results as well as personally view these results for my own interpretation.      Injection:   The risks and benefits of a steroid injection were discussed in detail. The risks include but are not limited to: pain, infection, swelling, flare response, bleeding, subcutaneous fat atrophy, skin depigmentation and/or elevation of blood sugar. The risk of incomplete resolution of symptoms, recurrence and additional intervention was reviewed and considered by the patient.  The patient agreed to proceed and under a sterile prep, I injected 1 unit (6mg) into 1 cc of a combination of Celestone and Lidocaine into the [bilateral basal joint, bilateral de Quervain's, right trigger thumb]. The patient tolerated the injection well.   The patient was adequately and thoroughly informed of my assessment of their current condition(s).   DISCUSSION: 1.  Injections as above.  Activity modifications.  Bracing as needed.  Follow-up in 10 weeks. 2.  He is considering surgical management. 3. [x]

## 2024-05-08 NOTE — PHYSICAL EXAM
[de-identified] : Examination at the level of the [bilateral] wrist reveals tenderness at the level of the first dorsal compartment with a positive finkelstein.   Examination of the [bilateral] thumb basal joint reveals pain with compression of the CMC joint with a positive grind test for pain. Examination of the hand(s) particularly at the A1 of the [right trigger thumb] reveals tenderness with a palpable click. [de-identified] : [4] views of [bilateral hands and wrists] were reviewed today in my office and were seen by me and discussed with the patient.  These [show findings consistent with bilateral basal joint OA and findings of IP joint OA]

## 2024-06-06 ENCOUNTER — APPOINTMENT (OUTPATIENT)
Dept: PULMONOLOGY | Facility: CLINIC | Age: 79
End: 2024-06-06
Payer: MEDICARE

## 2024-06-06 ENCOUNTER — APPOINTMENT (OUTPATIENT)
Dept: ORTHOPEDIC SURGERY | Facility: CLINIC | Age: 79
End: 2024-06-06
Payer: MEDICARE

## 2024-06-06 VITALS
RESPIRATION RATE: 16 BRPM | HEART RATE: 75 BPM | DIASTOLIC BLOOD PRESSURE: 80 MMHG | OXYGEN SATURATION: 98 % | SYSTOLIC BLOOD PRESSURE: 130 MMHG

## 2024-06-06 VITALS — WEIGHT: 165 LBS | HEIGHT: 67 IN | BODY MASS INDEX: 25.9 KG/M2

## 2024-06-06 DIAGNOSIS — M47.816 SPONDYLOSIS W/OUT MYELOPATHY OR RADICULOPATHY, LUMBAR REGION: ICD-10-CM

## 2024-06-06 DIAGNOSIS — M41.50 OTHER SECONDARY SCOLIOSIS, SITE UNSPECIFIED: ICD-10-CM

## 2024-06-06 DIAGNOSIS — J45.909 UNSPECIFIED ASTHMA, UNCOMPLICATED: ICD-10-CM

## 2024-06-06 PROCEDURE — 99213 OFFICE O/P EST LOW 20 MIN: CPT | Mod: 25

## 2024-06-06 PROCEDURE — 94010 BREATHING CAPACITY TEST: CPT

## 2024-06-06 PROCEDURE — 99442: CPT

## 2024-06-06 RX ORDER — MELOXICAM 15 MG/1
15 TABLET ORAL DAILY
Qty: 30 | Refills: 1 | Status: DISCONTINUED | COMMUNITY
Start: 2024-03-12 | End: 2024-06-06

## 2024-06-06 RX ORDER — ALBUTEROL SULFATE 90 UG/1
108 (90 BASE) INHALANT RESPIRATORY (INHALATION)
Qty: 1 | Refills: 5 | Status: ACTIVE | COMMUNITY
Start: 2024-06-06 | End: 1900-01-01

## 2024-06-06 RX ORDER — FLUTICASONE FUROATE AND VILANTEROL TRIFENATATE 100; 25 UG/1; UG/1
100-25 POWDER RESPIRATORY (INHALATION) DAILY
Qty: 1 | Refills: 5 | Status: DISCONTINUED | COMMUNITY
Start: 2023-12-06 | End: 2024-06-06

## 2024-06-06 NOTE — CONSULT LETTER
[Dear  ___] : Dear  [unfilled], [Consult Letter:] : I had the pleasure of evaluating your patient, [unfilled]. [Please see my note below.] : Please see my note below. [Sincerely,] : Sincerely, [DrErica  ___] : Dr. ALFORD [FreeTextEntry3] : Crow Wagner DO [facility]\par  Pulmonary Critical Care\par  Director Pulmonary Division\par  Medical Director Respiratory Therapy\par  [facility]\par  \par

## 2024-06-06 NOTE — HISTORY OF PRESENT ILLNESS
[Never] : never [TextBox_4] : never smoker doing well, no fever, chill,  no dyspnea has ED on cpap, now mild- cpap 8 Cardiology Dr Poon [facility]  saw vascular, no DVT has atypical  CP x yrs, Cardiology work up negative used albuterol prn, states it doesn't help  6/6/24 doing well denies any wheezing no fever, chill, chest pain has cardiology follow up ED on cpap follwoed by Dr Alcala

## 2024-06-06 NOTE — DISCUSSION/SUMMARY
[FreeTextEntry1] : Asthma , mild intermittent/persistent, never smoker, at baseline, no acute pulmonary complaints Spirometry with normal flows, sig improved FVC, very mild obstruction CXR 3/22 negative, will repeat  has ED, compliant with CPAP, ( 8cm water per pt ) , followed by PMD Cardiology follow up prn rescue follow up 6 -8 months or sooner if needed, to call if any change in symptoms

## 2024-06-10 NOTE — BRIEF OPERATIVE NOTE - PROCEDURE
Excision of synovial cyst of lumbar spine  07/25/2017  L3-l4 lami/ rxn of synovial cyst.  Active  [id number] Corneal abrasion

## 2024-07-24 ENCOUNTER — APPOINTMENT (OUTPATIENT)
Dept: ORTHOPEDIC SURGERY | Facility: CLINIC | Age: 79
End: 2024-07-24

## 2024-07-24 DIAGNOSIS — M25.531 PAIN IN RIGHT WRIST: ICD-10-CM

## 2024-07-24 DIAGNOSIS — M25.532 PAIN IN RIGHT WRIST: ICD-10-CM

## 2024-07-24 DIAGNOSIS — M19.049 PRIMARY OSTEOARTHRITIS, UNSPECIFIED HAND: ICD-10-CM

## 2024-07-24 DIAGNOSIS — M65.4 RADIAL STYLOID TENOSYNOVITIS [DE QUERVAIN]: ICD-10-CM

## 2024-07-24 PROCEDURE — 20550 NJX 1 TENDON SHEATH/LIGAMENT: CPT | Mod: 50,59

## 2024-07-24 PROCEDURE — 20600 DRAIN/INJ JOINT/BURSA W/O US: CPT | Mod: 50

## 2024-07-24 PROCEDURE — 99214 OFFICE O/P EST MOD 30 MIN: CPT | Mod: 25

## 2024-07-24 NOTE — ASSESSMENT
[FreeTextEntry1] : ASSESSMENT: [The patient was accompanied today and was assisted with explaining their complaints today.] The patient comes in today for follow up with chronic exacerbated bilateral wrist and thumb discomfort.  The symptoms are affecting his activities of daily living as well as activities that involve pinch and .  His symptoms are consistent with bilateral basal joint arthropathy and bilateral de Quervain's tenosynovitis. He has done well with prior injections. Treatment modalities were discussed, the patient elects for repeat injections.  He is considering surgical management, denervation discussed during today's visit.   The patient was adequately and thoroughly informed of my assessment of their current condition(s).  - This may diminish bodily function for the extremity.  We discussed prognosis, treatment modalities including operative and nonoperative options for the above diagnostic assessment. As always, 2nd opinion is always provided as an option. For this, when accessible, I was able to review other physicians note(s) including reviewing other tests, imaging results as well as personally view these results for my own interpretation.      Injection:   The risks and benefits of a steroid injection were discussed in detail. The risks include but are not limited to: pain, infection, swelling, flare response, bleeding, subcutaneous fat atrophy, skin depigmentation and/or elevation of blood sugar. The risk of incomplete resolution of symptoms, recurrence and additional intervention was reviewed and considered by the patient.  The patient agreed to proceed and under a sterile prep, I injected 1 unit (6mg) into 1 cc of a combination of Celestone and Lidocaine into the [bilateral basal joint, bilateral de Quervain's]. The patient tolerated the injection well.   The patient was adequately and thoroughly informed of my assessment of their current condition(s).   DISCUSSION: 1.  Injections as above.  Activity modifications.  Bracing as needed.  Follow-up in 10 weeks. 2.  He is considering surgical management, denervation discussed. 3. [x]

## 2024-07-24 NOTE — PHYSICAL EXAM
[de-identified] : Examination at the level of the [bilateral] wrist reveals tenderness at the level of the first dorsal compartment with a positive finkelstein.   Examination of the [bilateral] thumb basal joint reveals pain with compression of the CMC joint with a positive grind test for pain. Examination of the hand(s) particularly at the A1 of the [right trigger thumb] reveals tenderness with a palpable click. [de-identified] : [4] views of [bilateral hands and wrists] were reviewed today in my office and were seen by me and discussed with the patient.  These [show findings consistent with bilateral basal joint OA and findings of IP joint OA]

## 2024-07-24 NOTE — HISTORY OF PRESENT ILLNESS
[FreeTextEntry1] : Josse is a pleasant 78-year-old male who presents today with chronic exacerbated bilateral wrist and thumb discomfort.  The symptoms are affecting his activities of daily living as well as activities that involve pinch and .  He describes tremendous relief with his injections last visit, however symptoms have returned.

## 2024-07-29 NOTE — PHYSICAL EXAM
[de-identified] : GENERAL APPEARANCE: Well nourished and hydrated, pleasant, alert, and oriented x 3. Appears their stated age.  HEENT: Normocephalic, extraocular eye motion intact. Nasal septum midline. Oral cavity clear. External auditory canal clear.  RESPIRATORY: Breath sounds clear and audible in all lobes. No wheezing, No accessory muscle use. CARDIOVASCULAR: No apparent abnormalities. No lower leg edema. No varicosities. Pedal pulses are palpable. NEUROLOGIC: Sensation is normal, no muscle weakness in the upper or lower extremities. DERMATOLOGIC: No apparent skin lesions, moist, warm, no rash. SPINE: Cervical spine appears normal and moves freely; thoracic spine appears normal and moves freely; lumbosacral spine appears normal and moves freely, normal, nontender. MUSCULOSKELETAL: Hands, wrists, and elbows are normal and move freely, shoulders are normal and move freely.  PSYCHIATRIC: Oriented to person, place, and time, insight and judgement were intact and the affect was normal. [de-identified] : Ambulates normally. Bilateral hip exam showed no groin pain with SLR, ROM is full flexion with 60 degrees external and 30 degrees internal with pain, TI -, FADIR -. 5/5 motor strength in bilateral lower extremities. Sensory: Intact in bilateral lower extremities. DTRs: Biceps, brachioradialis, triceps, patellar, ankle and plantar 2+ and symmetric bilaterally. Pulses: dorsalis pedis, posterior tibial, femoral, popliteal, and radial 2+ and symmetric bilaterally. [de-identified] : AP pelvis and 2 views of bilateral hips obtained the office today show no acute fracture or dislocation.  No significant degenerative changes noted  MRI of the pelvis dated 2/14/2024 shows enlarged right hip multilobulated ganglion cyst or paralabral cyst.  No suspicious bone lesions 107

## 2024-08-22 ENCOUNTER — APPOINTMENT (OUTPATIENT)
Dept: PULMONOLOGY | Facility: CLINIC | Age: 79
End: 2024-08-22
Payer: MEDICARE

## 2024-08-22 VITALS — DIASTOLIC BLOOD PRESSURE: 86 MMHG | HEART RATE: 56 BPM | OXYGEN SATURATION: 96 % | SYSTOLIC BLOOD PRESSURE: 132 MMHG

## 2024-08-22 VITALS — WEIGHT: 161 LBS | HEIGHT: 67 IN | RESPIRATION RATE: 16 BRPM | BODY MASS INDEX: 25.27 KG/M2

## 2024-08-22 DIAGNOSIS — G47.33 OBSTRUCTIVE SLEEP APNEA (ADULT) (PEDIATRIC): ICD-10-CM

## 2024-08-22 PROCEDURE — G2211 COMPLEX E/M VISIT ADD ON: CPT

## 2024-08-22 PROCEDURE — 99214 OFFICE O/P EST MOD 30 MIN: CPT

## 2024-08-22 NOTE — REVIEW OF SYSTEMS
[Negative] : Musculoskeletal [FreeTextEntry3] : per HPI [FreeTextEntry8] : per HPI [FreeTextEntry9] : per HPI [de-identified] : per HPI

## 2024-08-22 NOTE — HISTORY OF PRESENT ILLNESS
[CPAP: ___ cmH2O] : CPAP: [unfilled] cmH2O [% Days used > 4 hrs: ____] : Days used > 4 hrs: [unfilled] % [Mean nightly usage: ___ hrs] : Mean nightly usage: [unfilled] hrs [___ min(s)] : [unfilled] min(s) [Therapy based AHI: ___ /hr] : Therapy based AHI: [unfilled] / hr [FreeTextEntry1] : Hx ED.  Thinks his last sleep study was   at [facility] in [place]. Told mod to severe. Still do not have records.   Dx with ED early 2000s; no treatment for a few years. Started CPAP about about 2009. Been on it since. On CPAP 8. Using an under the nose mask; does not know which one.  Uses it every night.   On last visit here, c/o snoring with it on when his mouth opens.  Wake up dry mouth. Ordered F30i FFM on last visit here but he said he could not get it from [facility] despite it being ordered. Still using nasal mask.   Still with EDS. Only avg 6hrs per night of sleep.     Lost significant weight since last sleep study.   Hx asthma. Sees Dr Wagner.  Hx HTN.    Never smoked.

## 2024-08-22 NOTE — PLAN
[TextEntry] : Continue CPAP. Increase number of hours of sleep. Can take a nap in the afternoon. Reviewed option of stimulant therapy; he is not interested. Again recc try FFM. I do not have samples his size. He will try to get one on his own b/c he is afraid to get one from his DME b/c then he would not be able to go back to the nasal right away.  F/U with Dr Wagner.

## 2024-08-29 ENCOUNTER — OFFICE (OUTPATIENT)
Dept: URBAN - METROPOLITAN AREA CLINIC 1 | Facility: CLINIC | Age: 79
Setting detail: OPHTHALMOLOGY
End: 2024-08-29
Payer: MEDICARE

## 2024-08-29 DIAGNOSIS — H01.002: ICD-10-CM

## 2024-08-29 DIAGNOSIS — H01.004: ICD-10-CM

## 2024-08-29 DIAGNOSIS — H43.392: ICD-10-CM

## 2024-08-29 DIAGNOSIS — Z96.1: ICD-10-CM

## 2024-08-29 DIAGNOSIS — H01.001: ICD-10-CM

## 2024-08-29 DIAGNOSIS — H43.811: ICD-10-CM

## 2024-08-29 DIAGNOSIS — H16.223: ICD-10-CM

## 2024-08-29 DIAGNOSIS — H52.4: ICD-10-CM

## 2024-08-29 DIAGNOSIS — H01.005: ICD-10-CM

## 2024-08-29 PROCEDURE — 92014 COMPRE OPH EXAM EST PT 1/>: CPT | Performed by: OPHTHALMOLOGY

## 2024-08-29 PROCEDURE — 92015 DETERMINE REFRACTIVE STATE: CPT | Performed by: OPHTHALMOLOGY

## 2024-08-29 ASSESSMENT — CONFRONTATIONAL VISUAL FIELD TEST (CVF)
OS_FINDINGS: FULL
OD_FINDINGS: FULL

## 2024-08-29 ASSESSMENT — LID EXAM ASSESSMENTS
OD_BLEPHARITIS: RLL RUL
OS_BLEPHARITIS: LLL LUL

## 2024-10-09 ENCOUNTER — APPOINTMENT (OUTPATIENT)
Dept: ORTHOPEDIC SURGERY | Facility: CLINIC | Age: 79
End: 2024-10-09
Payer: MEDICARE

## 2024-10-09 DIAGNOSIS — M19.049 PRIMARY OSTEOARTHRITIS, UNSPECIFIED HAND: ICD-10-CM

## 2024-10-09 DIAGNOSIS — M25.531 PAIN IN RIGHT WRIST: ICD-10-CM

## 2024-10-09 DIAGNOSIS — M25.532 PAIN IN RIGHT WRIST: ICD-10-CM

## 2024-10-09 DIAGNOSIS — M65.4 RADIAL STYLOID TENOSYNOVITIS [DE QUERVAIN]: ICD-10-CM

## 2024-10-09 PROCEDURE — 99214 OFFICE O/P EST MOD 30 MIN: CPT

## 2024-10-09 RX ORDER — DICLOFENAC SODIUM 10 MG/G
1 GEL TOPICAL DAILY
Qty: 1 | Refills: 0 | Status: ACTIVE | COMMUNITY
Start: 2024-10-09 | End: 1900-01-01

## 2024-11-20 ENCOUNTER — APPOINTMENT (OUTPATIENT)
Dept: ORTHOPEDIC SURGERY | Facility: CLINIC | Age: 79
End: 2024-11-20
Payer: MEDICARE

## 2024-11-20 DIAGNOSIS — M65.4 RADIAL STYLOID TENOSYNOVITIS [DE QUERVAIN]: ICD-10-CM

## 2024-11-20 DIAGNOSIS — M19.049 PRIMARY OSTEOARTHRITIS, UNSPECIFIED HAND: ICD-10-CM

## 2024-11-20 DIAGNOSIS — M25.531 PAIN IN RIGHT WRIST: ICD-10-CM

## 2024-11-20 DIAGNOSIS — M25.532 PAIN IN RIGHT WRIST: ICD-10-CM

## 2024-11-20 PROCEDURE — 20600 DRAIN/INJ JOINT/BURSA W/O US: CPT | Mod: 50

## 2024-11-20 PROCEDURE — 20550 NJX 1 TENDON SHEATH/LIGAMENT: CPT | Mod: 50,59

## 2024-11-20 PROCEDURE — 99214 OFFICE O/P EST MOD 30 MIN: CPT | Mod: 25

## 2025-02-10 NOTE — DISCHARGE NOTE PROVIDER - NSDCCPCAREPLAN_GEN_ALL_CORE_FT
PRINCIPAL DISCHARGE DIAGNOSIS  Diagnosis: CAD (coronary artery disease)  Assessment and Plan of Treatment: Restricted use with no heavy lifting of affected arm for 48 hours.  No submerging the arm in water for 48 hours.  You may start showering today.  Call your doctor for any bleeding, swelling, loss of sensation in the hand or fingers, or fingers turning blue.  If heavy bleeding or large lumps form, hold pressure at the spot and come to the Emergency Room.        
Type 2 diabetes mellitus

## 2025-02-25 ENCOUNTER — APPOINTMENT (OUTPATIENT)
Dept: ORTHOPEDIC SURGERY | Facility: CLINIC | Age: 80
End: 2025-02-25
Payer: MEDICARE

## 2025-02-25 DIAGNOSIS — M25.531 PAIN IN RIGHT WRIST: ICD-10-CM

## 2025-02-25 DIAGNOSIS — M65.4 RADIAL STYLOID TENOSYNOVITIS [DE QUERVAIN]: ICD-10-CM

## 2025-02-25 DIAGNOSIS — M19.049 PRIMARY OSTEOARTHRITIS, UNSPECIFIED HAND: ICD-10-CM

## 2025-02-25 DIAGNOSIS — M25.532 PAIN IN RIGHT WRIST: ICD-10-CM

## 2025-02-25 PROCEDURE — 20550 NJX 1 TENDON SHEATH/LIGAMENT: CPT | Mod: 50,59

## 2025-02-25 PROCEDURE — 20600 DRAIN/INJ JOINT/BURSA W/O US: CPT | Mod: 50

## 2025-02-25 PROCEDURE — 99214 OFFICE O/P EST MOD 30 MIN: CPT | Mod: 25

## 2025-05-27 ENCOUNTER — APPOINTMENT (OUTPATIENT)
Dept: ORTHOPEDIC SURGERY | Facility: CLINIC | Age: 80
End: 2025-05-27

## 2025-05-27 DIAGNOSIS — M65.4 RADIAL STYLOID TENOSYNOVITIS [DE QUERVAIN]: ICD-10-CM

## 2025-05-27 DIAGNOSIS — M25.642 STIFFNESS OF RIGHT HAND, NOT ELSEWHERE CLASSIFIED: ICD-10-CM

## 2025-05-27 DIAGNOSIS — M18.0 BILATERAL PRIMARY OSTEOARTHRITIS OF FIRST CARPOMETACARPAL JOINTS: ICD-10-CM

## 2025-05-27 DIAGNOSIS — M25.641 STIFFNESS OF RIGHT HAND, NOT ELSEWHERE CLASSIFIED: ICD-10-CM

## 2025-05-27 PROCEDURE — 20605 DRAIN/INJ JOINT/BURSA W/O US: CPT | Mod: 50

## 2025-05-27 PROCEDURE — 99214 OFFICE O/P EST MOD 30 MIN: CPT | Mod: 25

## 2025-05-27 PROCEDURE — 20550 NJX 1 TENDON SHEATH/LIGAMENT: CPT | Mod: 50,59

## 2025-08-25 ENCOUNTER — NON-APPOINTMENT (OUTPATIENT)
Age: 80
End: 2025-08-25

## 2025-08-26 ENCOUNTER — APPOINTMENT (OUTPATIENT)
Dept: ORTHOPEDIC SURGERY | Facility: CLINIC | Age: 80
End: 2025-08-26

## 2025-08-26 DIAGNOSIS — M25.641 STIFFNESS OF RIGHT HAND, NOT ELSEWHERE CLASSIFIED: ICD-10-CM

## 2025-08-26 DIAGNOSIS — M65.4 RADIAL STYLOID TENOSYNOVITIS [DE QUERVAIN]: ICD-10-CM

## 2025-08-26 DIAGNOSIS — M18.0 BILATERAL PRIMARY OSTEOARTHRITIS OF FIRST CARPOMETACARPAL JOINTS: ICD-10-CM

## 2025-08-26 DIAGNOSIS — M25.642 STIFFNESS OF RIGHT HAND, NOT ELSEWHERE CLASSIFIED: ICD-10-CM

## 2025-08-26 PROCEDURE — 20550 NJX 1 TENDON SHEATH/LIGAMENT: CPT | Mod: 50,59

## 2025-08-26 PROCEDURE — 99214 OFFICE O/P EST MOD 30 MIN: CPT | Mod: 25

## 2025-08-26 PROCEDURE — 20605 DRAIN/INJ JOINT/BURSA W/O US: CPT | Mod: 50
